# Patient Record
Sex: MALE | Race: BLACK OR AFRICAN AMERICAN | NOT HISPANIC OR LATINO
[De-identification: names, ages, dates, MRNs, and addresses within clinical notes are randomized per-mention and may not be internally consistent; named-entity substitution may affect disease eponyms.]

---

## 2019-02-26 PROBLEM — Z00.00 ENCOUNTER FOR PREVENTIVE HEALTH EXAMINATION: Status: ACTIVE | Noted: 2019-02-26

## 2019-03-11 ENCOUNTER — APPOINTMENT (OUTPATIENT)
Dept: SURGERY | Facility: CLINIC | Age: 64
End: 2019-03-11
Payer: COMMERCIAL

## 2019-03-11 VITALS
DIASTOLIC BLOOD PRESSURE: 80 MMHG | WEIGHT: 216.06 LBS | OXYGEN SATURATION: 96 % | SYSTOLIC BLOOD PRESSURE: 119 MMHG | HEIGHT: 75 IN | BODY MASS INDEX: 26.86 KG/M2 | HEART RATE: 84 BPM

## 2019-03-11 DIAGNOSIS — S31.139A PUNCTURE WOUND OF ABDOMINAL WALL W/OUT FOREIGN BODY, UNSPECIFIED QUADRANT W/OUT PENETRATION INTO PERITONEAL CAVITY, INITIAL ENCOUNTER: ICD-10-CM

## 2019-03-11 DIAGNOSIS — Z80.42 FAMILY HISTORY OF MALIGNANT NEOPLASM OF PROSTATE: ICD-10-CM
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
## 2019-03-11 DIAGNOSIS — B19.20 UNSPECIFIED VIRAL HEPATITIS C W/OUT HEPATIC COMA: ICD-10-CM

## 2019-03-11 DIAGNOSIS — Z82.49 FAMILY HISTORY OF ISCHEMIC HEART DISEASE AND OTHER DISEASES OF THE CIRCULATORY SYSTEM: ICD-10-CM

## 2019-03-11 DIAGNOSIS — Z87.891 PERSONAL HISTORY OF NICOTINE DEPENDENCE: ICD-10-CM

## 2019-03-11 DIAGNOSIS — Z87.898 PERSONAL HISTORY OF OTHER SPECIFIED CONDITIONS: ICD-10-CM

## 2019-03-11 DIAGNOSIS — F10.11 ALCOHOL ABUSE, IN REMISSION: ICD-10-CM

## 2019-03-11 DIAGNOSIS — K40.90 UNILATERAL INGUINAL HERNIA, W/OUT OBSTRUCTION OR GANGRENE, NOT SPECIFIED AS RECURRENT: ICD-10-CM

## 2019-03-11 DIAGNOSIS — Z80.0 FAMILY HISTORY OF MALIGNANT NEOPLASM OF DIGESTIVE ORGANS: ICD-10-CM

## 2019-03-11 DIAGNOSIS — Z83.3 FAMILY HISTORY OF DIABETES MELLITUS: ICD-10-CM

## 2019-03-11 PROCEDURE — 99244 OFF/OP CNSLTJ NEW/EST MOD 40: CPT

## 2019-03-11 RX ORDER — ASPIRIN ENTERIC COATED TABLETS 81 MG 81 MG/1
81 TABLET, DELAYED RELEASE ORAL
Refills: 0 | Status: ACTIVE | COMMUNITY

## 2019-03-11 NOTE — HISTORY OF PRESENT ILLNESS
[de-identified] : Mr. Tapia presented today for evaluation and management of a mass on the back of his neck, a mass of his right forearm, and a mass of his left upper arm, as well as a left inguinal hernia.  He stated the masses have been enlarging and have been present for at least one year.  He stated the masses hurt occasionally, although he described the pain as a tightness in the area of the masses.  He denied any episodes of rapid enlargement, erythema, or drainage of the masses.  He also noted he has a left inguinal hernia that occasionally causes pain.  He was scheduled for surgery a few years ago, but had just had his hip surgery on the same side and never rescheduled the procedure.
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
## 2019-03-11 NOTE — DATA REVIEWED
[FreeTextEntry1] : US abdomen (2/11/2019) - prominent liver with possible hepatic hemangioma.  Correlation with CT or MRI with contrast recommended.  No additional abnormal findings.\par \par CT abdomen (3/7/2019) - 3 arterial phase focal areas of nodular enhancement possibly benign vascular shunts.  The questionable hemangioma on ultrasound is not visualized.  Recommend correlation with contrast liver MRI in 6 months.

## 2019-03-11 NOTE — ASSESSMENT
[FreeTextEntry1] : Mr. Tapia is a 64-year-old man with a left upper extremity mass, a right forearm mass, a posterior neck mass, a left inguinal hernia, an umbilical incisional hernia, and a possible right inguinal hernia.  We will plan for a laparoscopic versus robotic left, possible bilateral, inguinal hernia repair with mesh, possible umbilical incisional hernia repair with mesh, excision of a left upper arm mass, excision of a right forearm mass, and excision of the posterior neck mass.  He stated he will be undergoing treatment for Hepatitis C, which should be completed prior to scheduling the planned procedures.

## 2019-03-11 NOTE — REVIEW OF SYSTEMS
[Suicidal] : not suicidal [Sleep Disturbances] : sleep disturbances [Anxiety] : no anxiety [Depression] : no depression [Change In Personality] : no personality change [Emotional Problems] : no emotional problems [Negative] : Heme/Lymph

## 2019-05-02 ENCOUNTER — APPOINTMENT (OUTPATIENT)
Dept: ENDOCRINOLOGY | Facility: CLINIC | Age: 64
End: 2019-05-02
Payer: COMMERCIAL

## 2019-05-02 VITALS
BODY MASS INDEX: 26.86 KG/M2 | DIASTOLIC BLOOD PRESSURE: 82 MMHG | WEIGHT: 216 LBS | HEART RATE: 103 BPM | HEIGHT: 75 IN | SYSTOLIC BLOOD PRESSURE: 144 MMHG

## 2019-05-02 LAB
GLUCOSE BLDC GLUCOMTR-MCNC: 378
HBA1C MFR BLD HPLC: 7.8

## 2019-05-02 PROCEDURE — 82962 GLUCOSE BLOOD TEST: CPT

## 2019-05-02 PROCEDURE — 83036 HEMOGLOBIN GLYCOSYLATED A1C: CPT | Mod: QW

## 2019-05-02 PROCEDURE — 99203 OFFICE O/P NEW LOW 30 MIN: CPT | Mod: 25

## 2019-05-02 RX ORDER — LIRAGLUTIDE 6 MG/ML
INJECTION SUBCUTANEOUS
Refills: 0 | Status: DISCONTINUED | COMMUNITY
End: 2019-05-02

## 2019-05-29 ENCOUNTER — APPOINTMENT (OUTPATIENT)
Dept: ENDOCRINOLOGY | Facility: CLINIC | Age: 64
End: 2019-05-29
Payer: COMMERCIAL

## 2019-05-29 VITALS
WEIGHT: 221 LBS | SYSTOLIC BLOOD PRESSURE: 128 MMHG | BODY MASS INDEX: 27.62 KG/M2 | DIASTOLIC BLOOD PRESSURE: 83 MMHG | HEART RATE: 82 BPM

## 2019-05-29 LAB — GLUCOSE BLDC GLUCOMTR-MCNC: 135

## 2019-05-29 PROCEDURE — 82962 GLUCOSE BLOOD TEST: CPT

## 2019-05-29 PROCEDURE — 99215 OFFICE O/P EST HI 40 MIN: CPT | Mod: 25

## 2019-05-29 NOTE — ASSESSMENT
[FreeTextEntry1] : Type 2 diabetes mellitus. HbA1c 7.8% last visit and blood glucose today 135 mg/dL today. He reports HbA1c in the 6% range for many years before cessation of Victoza. History of neuropathy. His blood sugars have not recently been at goal after changing from Victoza to Trulicity. Since a rapid reduction in hepatitis C viral load during direct-acting antiviral therapy for hepatitis C may lead to improvement in glucose metabolism and possible hypoglycemia, we will not adjust his regimen at this time. I recommend close follow-up, with possible change in regimen next visit as needed; can consider change from Trulicity to Ozempic if covered.\par Request recent laboratory testing\par Continue metformin 1000 mg twice daily\par Continue Trulicity 1.5 mg weekly\par Continue Farxiga 10 mg daily\par Check blood sugars 1-2 times daily\par He is on aspirin\par He is on a blood pressure regimen; blood pressure around goal\par He is not on a statin for cholesterol; reassess after hepatitis C therapy\par Nephrology screening: Treated with an angiotenin receptor blocker\par Last ophthalmology appointment: Around January 2019\par Last podiatry appointment: Upcoming appointment with podiatry\par Last dental appointment: May 2019\par \par Return to see nurse practitioner and nutrition in 1 month. Return to see me in 3 months. Patient advised to call earlier with significant hypo- or hyperglycemia.\par \par CC:\par Dr. Dixon Feng, Fax [phone]

## 2019-05-29 NOTE — HISTORY OF PRESENT ILLNESS
[FreeTextEntry1] : Mr. Tapia is a 64 year-old man with a history of type 2 diabetes mellitus, hypertension, hepatitis C presenting to establish care with me. He was seen by Analy Muñoz at the beginning of May 2019.\par \par Type 2 diabetes mellitus. HbA1c 7.8% last visit and blood glucose today 135 mg/dL today. He reports HbA1c in the 6% range for many years before cessation of Victoza. History of neuropathy.\par He was diagnosed with diabetes around 2009. He was hospitalized at the time of diagnosis; no subsequent hospitalizations for hyper- or hypoglycemia.\par He is currently taking metformin 1000 mg twice daily, Trulicity 1.5 mg weekly, Farxiga 10 mg daily. He was previously taking Victoza with good effect but insurance would no longer cover. Trulicity was started at his visit with Analy.\par He is checking blood sugars a few times per week. Recent postprandial values in the low 200s mg/dL. No recent hypoglycemia.\par He is on aspirin\par He is on a blood pressure regimen\par He is not on a statin for cholesterol\par Nephrology screening: Treated with an angiotenin receptor blocker\par Last ophthalmology appointment: Around January 2019\par Last podiatry appointment: Upcoming appointment with podiatry\par Last dental appointment: May 2019\par Diet: He has stopped eating red meat. He is eating more vegetables and lean proteins. He was having grapes and watermelon, but realized that they were causing hyperglycemia. \par Exercise: Gym 7 days/week, bicycle, weights, martial arts\par \par He started Harvoni the afternoon of his initial appointment here. He will be having hernia repairs and excision of masses in left upper extremity, right forearm, posterior neck once he completes Harvoni. He has chronic polyuria. No chest pain, shortness of breath, polydipsia, lower extremity numbness/tingling recently even off gabapentin.

## 2019-05-29 NOTE — PHYSICAL EXAM
[Alert] : alert [No Acute Distress] : no acute distress [Healthy Appearance] : healthy appearance [Normal Sclera/Conjunctiva] : normal sclera/conjunctiva [Normal Oropharynx] : the oropharynx was normal [No Neck Mass] : no neck mass was observed [Supple] : the neck was supple [No LAD] : no lymphadenopathy [No Thyroid Nodules] : there were no palpable thyroid nodules [Thyroid Not Enlarged] : the thyroid was not enlarged [Normal Rate and Effort] : normal respiratory rhythm and effort [Clear to Auscultation] : lungs were clear to auscultation bilaterally [Normal Rate] : heart rate was normal  [Normal S1, S2] : normal S1 and S2 [Regular Rhythm] : with a regular rhythm [Normal Gait] : normal gait [No Stigmata of Cushings Syndrome] : no stigmata of cushings syndrome [Normal Insight/Judgement] : insight and judgment were intact [Kyphosis] : no kyphosis present [de-identified] : no moon facies, no supraclavicular fat pads [Acanthosis Nigricans] : no acanthosis nigricans

## 2019-07-11 ENCOUNTER — MEDICATION RENEWAL (OUTPATIENT)
Age: 64
End: 2019-07-11

## 2019-07-18 ENCOUNTER — APPOINTMENT (OUTPATIENT)
Dept: ENDOCRINOLOGY | Facility: CLINIC | Age: 64
End: 2019-07-18
Payer: COMMERCIAL

## 2019-07-18 VITALS
SYSTOLIC BLOOD PRESSURE: 121 MMHG | WEIGHT: 229 LBS | HEART RATE: 81 BPM | BODY MASS INDEX: 28.62 KG/M2 | DIASTOLIC BLOOD PRESSURE: 80 MMHG

## 2019-07-18 LAB
GLUCOSE BLDC GLUCOMTR-MCNC: 111
HBA1C MFR BLD HPLC: 7.2

## 2019-07-18 PROCEDURE — 83036 HEMOGLOBIN GLYCOSYLATED A1C: CPT | Mod: QW

## 2019-07-18 PROCEDURE — 82962 GLUCOSE BLOOD TEST: CPT

## 2019-07-18 PROCEDURE — 36415 COLL VENOUS BLD VENIPUNCTURE: CPT

## 2019-07-18 PROCEDURE — 99214 OFFICE O/P EST MOD 30 MIN: CPT | Mod: 25

## 2019-07-18 PROCEDURE — 97802 MEDICAL NUTRITION INDIV IN: CPT

## 2019-07-23 LAB
ALBUMIN SERPL ELPH-MCNC: 4.6 G/DL
ALP BLD-CCNC: 69 U/L
ALT SERPL-CCNC: 19 U/L
ANION GAP SERPL CALC-SCNC: 13 MMOL/L
AST SERPL-CCNC: 21 U/L
BASOPHILS # BLD AUTO: 0.04 K/UL
BASOPHILS NFR BLD AUTO: 0.8 %
BILIRUB SERPL-MCNC: 0.5 MG/DL
BUN SERPL-MCNC: 16 MG/DL
CALCIUM SERPL-MCNC: 9.7 MG/DL
CHLORIDE SERPL-SCNC: 103 MMOL/L
CHOLEST SERPL-MCNC: 232 MG/DL
CHOLEST/HDLC SERPL: 4.8 RATIO
CO2 SERPL-SCNC: 26 MMOL/L
CREAT SERPL-MCNC: 1.19 MG/DL
CREAT SPEC-SCNC: 202 MG/DL
EOSINOPHIL # BLD AUTO: 0.11 K/UL
EOSINOPHIL NFR BLD AUTO: 2.1 %
ESTIMATED AVERAGE GLUCOSE: 177 MG/DL
GLUCOSE SERPL-MCNC: 101 MG/DL
HBA1C MFR BLD HPLC: 7.8 %
HCT VFR BLD CALC: 49.6 %
HDLC SERPL-MCNC: 48 MG/DL
HGB BLD-MCNC: 15 G/DL
IMM GRANULOCYTES NFR BLD AUTO: 0.4 %
LDLC SERPL CALC-MCNC: 170 MG/DL
LYMPHOCYTES # BLD AUTO: 1.35 K/UL
LYMPHOCYTES NFR BLD AUTO: 25.7 %
MAN DIFF?: NORMAL
MCHC RBC-ENTMCNC: 25.5 PG
MCHC RBC-ENTMCNC: 30.2 GM/DL
MCV RBC AUTO: 84.2 FL
MICROALBUMIN 24H UR DL<=1MG/L-MCNC: 2.1 MG/DL
MICROALBUMIN/CREAT 24H UR-RTO: 10 MG/G
MONOCYTES # BLD AUTO: 0.49 K/UL
MONOCYTES NFR BLD AUTO: 9.3 %
NEUTROPHILS # BLD AUTO: 3.24 K/UL
NEUTROPHILS NFR BLD AUTO: 61.7 %
PLATELET # BLD AUTO: 200 K/UL
POTASSIUM SERPL-SCNC: 4.9 MMOL/L
PROT SERPL-MCNC: 7.8 G/DL
RBC # BLD: 5.89 M/UL
RBC # FLD: 14.1 %
SODIUM SERPL-SCNC: 142 MMOL/L
T3 SERPL-MCNC: 116 NG/DL
T4 FREE SERPL-MCNC: 1.3 NG/DL
TRIGL SERPL-MCNC: 72 MG/DL
TSH SERPL-ACNC: 0.91 UIU/ML
VIT B12 SERPL-MCNC: 424 PG/ML
WBC # FLD AUTO: 5.25 K/UL

## 2019-09-25 ENCOUNTER — APPOINTMENT (OUTPATIENT)
Dept: ENDOCRINOLOGY | Facility: CLINIC | Age: 64
End: 2019-09-25
Payer: COMMERCIAL

## 2019-09-25 VITALS
BODY MASS INDEX: 28 KG/M2 | WEIGHT: 224 LBS | SYSTOLIC BLOOD PRESSURE: 121 MMHG | HEART RATE: 78 BPM | DIASTOLIC BLOOD PRESSURE: 79 MMHG

## 2019-09-25 LAB
GLUCOSE BLDC GLUCOMTR-MCNC: 119
HBA1C MFR BLD HPLC: 6.6

## 2019-09-25 PROCEDURE — 99214 OFFICE O/P EST MOD 30 MIN: CPT | Mod: 25

## 2019-09-25 PROCEDURE — 82962 GLUCOSE BLOOD TEST: CPT

## 2019-09-25 PROCEDURE — 83036 HEMOGLOBIN GLYCOSYLATED A1C: CPT | Mod: QW

## 2019-09-25 RX ORDER — LEDIPASVIR AND SOFOSBUVIR 45; 200 MG/1; MG/1
TABLET, FILM COATED ORAL
Refills: 0 | Status: DISCONTINUED | COMMUNITY
End: 2019-09-25

## 2019-09-25 RX ORDER — DULAGLUTIDE 1.5 MG/.5ML
1.5 INJECTION, SOLUTION SUBCUTANEOUS
Refills: 0 | Status: DISCONTINUED | COMMUNITY
Start: 2019-05-02 | End: 2019-09-25

## 2019-09-25 RX ORDER — DULAGLUTIDE 1.5 MG/.5ML
1.5 INJECTION, SOLUTION SUBCUTANEOUS
Qty: 3 | Refills: 3 | Status: DISCONTINUED | COMMUNITY
Start: 2019-05-02 | End: 2019-09-25

## 2019-09-25 NOTE — PHYSICAL EXAM
[Alert] : alert [No Acute Distress] : no acute distress [Healthy Appearance] : healthy appearance [Normal Oropharynx] : the oropharynx was normal [Normal Sclera/Conjunctiva] : normal sclera/conjunctiva [No Neck Mass] : no neck mass was observed [Supple] : the neck was supple [No LAD] : no lymphadenopathy [Thyroid Not Enlarged] : the thyroid was not enlarged [No Thyroid Nodules] : there were no palpable thyroid nodules [Normal Rate and Effort] : normal respiratory rhythm and effort [Clear to Auscultation] : lungs were clear to auscultation bilaterally [Normal Rate] : heart rate was normal  [Normal S1, S2] : normal S1 and S2 [Regular Rhythm] : with a regular rhythm [No Stigmata of Cushings Syndrome] : no stigmata of cushings syndrome [Normal Gait] : normal gait [Normal Insight/Judgement] : insight and judgment were intact [Kyphosis] : no kyphosis present [Acanthosis Nigricans] : no acanthosis nigricans [de-identified] : no moon facies, no supraclavicular fat pads

## 2019-09-25 NOTE — ASSESSMENT
[FreeTextEntry1] : Type 2 diabetes mellitus. HbA1c 6.6% and blood glucose 119 mg/dL today; HbA1c 7.2% in July 2019 and 7.8% in May 2019. Neuropathy. I congratulated him on his excellent glycemic control. We discussed evaluation by neurology for other etiologies of neuropathy since he now has worsening symptoms despite significant improvement in glycemic control. \par Continue metformin 1000 mg twice daily\par Adjust Ozempic to 0.5 mg weekly\par Continue Farxiga 10 mg daily\par Check blood sugars a few times per week\par He is on aspirin\par He is on a blood pressure regimen; blood pressure around goal\par He is not on a statin for cholesterol; he is amenable to starting atorvastatin 40 mg daily. We reviewed the risks and benefits of statin therapy, including but not limited to myalgias and arthralgias.\par Nephrology screening: Urine microalbumin within range in July 2019; treated with an angiotenin receptor blocker\par Last ophthalmology appointment: Around January 2019\par Last podiatry appointment: Upcoming appointment with podiatry\par Last dental appointment: May 2019\par \par Return to see me in 3 months. Patient advised to call earlier with significant hypo- or hyperglycemia.\par \par CC:\par Dr. Dixon Feng, Fax [phone]

## 2019-09-25 NOTE — HISTORY OF PRESENT ILLNESS
[FreeTextEntry1] : Mr. Tapia is a 64 year-old man with a history of type 2 diabetes mellitus, hypertension, hepatitis C now status post [facility] presenting for follow-up of his endocrine issues. I saw him for an initial visit in May 2019; he was seen by Analy Muñoz in July.\par \par Type 2 diabetes mellitus. HbA1c 6.6% and blood glucose 119 mg/dL today; HbA1c 7.2% in July 2019 and 7.8% in May 2019. He reports HbA1c in the 6% range for many years before cessation of Victoza. History of neuropathy.\par He was diagnosed with diabetes around 2009. He was hospitalized at the time of diagnosis; no subsequent hospitalizations for hyper- or hypoglycemia.\par At his initial visit he was taking metformin 1000 mg twice daily, Trulicity 1.5 mg weekly, Farxiga 10 mg daily. He was previously taking Victoza with good effect but insurance would no longer cover. Trulicity was started at his visit with Analy. Trulicity was recommended to be changed to Ozempic in July 2019 at his appointment; he just completed his prescription of Trulicity and started Ozempic last week.\par He is checking blood sugars a few times per week as below. No recent hypoglycemia.\par He is on aspirin\par He is on a blood pressure regimen\par He is not on a statin for cholesterol\par Nephrology screening: Urine microalbumin within range in July 2019; treated with an angiotenin receptor blocker\par Last ophthalmology appointment: Around January 2019\par Last podiatry appointment: Upcoming appointment with podiatry\par Last dental appointment: May 2019\par \par Interim History \par Fasting blood sugars up to 170 mg/dL today; usually 140s mg/dL. Postprandial blood sugars all lower than 180s mg/dL if checked. He started Ozempic 0.25 mg weekly last Thursday.\par He completed therapy with Harvoni with a good response per his report.\par He had a cardiac stress test that was normal in September 2018 per his report.\par He has pain in his feet bilaterally; gabapentin makes him sleepy so he does not take daily. He has chronic polyuria. He has occasional burning chest pain after meals. No shortness of breath, polydipsia.\par Medical and surgical history, medications, allergies, social and family history reviewed and updated as needed.

## 2019-11-15 ENCOUNTER — RX RENEWAL (OUTPATIENT)
Age: 64
End: 2019-11-15

## 2019-12-18 ENCOUNTER — APPOINTMENT (OUTPATIENT)
Dept: ENDOCRINOLOGY | Facility: CLINIC | Age: 64
End: 2019-12-18

## 2020-01-08 ENCOUNTER — APPOINTMENT (OUTPATIENT)
Dept: ENDOCRINOLOGY | Facility: CLINIC | Age: 65
End: 2020-01-08
Payer: COMMERCIAL

## 2020-01-08 ENCOUNTER — RESULT CHARGE (OUTPATIENT)
Age: 65
End: 2020-01-08

## 2020-01-08 VITALS
BODY MASS INDEX: 26.76 KG/M2 | WEIGHT: 222 LBS | DIASTOLIC BLOOD PRESSURE: 77 MMHG | HEIGHT: 76.5 IN | HEART RATE: 84 BPM | SYSTOLIC BLOOD PRESSURE: 120 MMHG

## 2020-01-08 LAB
GLUCOSE BLDC GLUCOMTR-MCNC: 149
HBA1C MFR BLD HPLC: 6.8

## 2020-01-08 PROCEDURE — 82947 ASSAY GLUCOSE BLOOD QUANT: CPT | Mod: QW

## 2020-01-08 PROCEDURE — 83036 HEMOGLOBIN GLYCOSYLATED A1C: CPT | Mod: QW

## 2020-01-08 PROCEDURE — 99214 OFFICE O/P EST MOD 30 MIN: CPT | Mod: 25
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
## 2020-01-08 NOTE — ASSESSMENT
[FreeTextEntry1] : Type 2 diabetes mellitus. Point-of-care HbA1c 6.8% and blood glucose 149 mg/dL today; HbA1c 6.6% in September 2019, 7.2% in July 2019 and 7.8% in May 2019. Neuropathy. I congratulated him on his excellent glycemic control. He is tolerating his current regimen and we will continue. We discussed evaluation by neurology for other etiologies of neuropathy since he now has worsening symptoms despite significant improvement in glycemic control. \par Continue metformin 1000 mg twice daily\par Continue Ozempic 0.5 mg weekly\par Continue Farxiga 10 mg daily\par Check blood sugars a few times per week\par He is on aspirin\par He is on a blood pressure regimen; blood pressure around goal\par He is on a statin for cholesterol; check lipid panel next visit and titrate as needed\par Nephrology screening: Urine microalbumin within range in July 2019; treated with an angiotenin receptor blocker\par Last ophthalmology appointment: Advised appointment\par Last podiatry appointment: Advised appointment\par Last dental appointment: January 2020\par \par Diabetic neuropathy. He has pain in his feet bilaterally; gabapentin makes him sleepy so he does not take daily. He has a referral to see a neurologist. We discussed a trial of capsaicin cream. \par \par Erectile dysfunction. We discussed referral to urology.\par \par Return to see me in 4 months. Patient advised to call earlier with significant hypo- or hyperglycemia.\par \par CC:\par Dr. Dixon Feng, Fax [phone]

## 2020-01-08 NOTE — PHYSICAL EXAM
[Alert] : alert [No Acute Distress] : no acute distress [Healthy Appearance] : healthy appearance [Normal Sclera/Conjunctiva] : normal sclera/conjunctiva [Normal Oropharynx] : the oropharynx was normal [No Neck Mass] : no neck mass was observed [Supple] : the neck was supple [No LAD] : no lymphadenopathy [Thyroid Not Enlarged] : the thyroid was not enlarged [No Thyroid Nodules] : there were no palpable thyroid nodules [Normal Rate and Effort] : normal respiratory rhythm and effort [Clear to Auscultation] : lungs were clear to auscultation bilaterally [Normal Rate] : heart rate was normal  [Normal S1, S2] : normal S1 and S2 [Regular Rhythm] : with a regular rhythm [No Stigmata of Cushings Syndrome] : no stigmata of cushings syndrome [Normal Gait] : normal gait [Normal Insight/Judgement] : insight and judgment were intact [Acanthosis Nigricans] : no acanthosis nigricans [Kyphosis] : no kyphosis present [de-identified] : no moon facies, no supraclavicular fat pads

## 2020-02-27 ENCOUNTER — APPOINTMENT (OUTPATIENT)
Dept: UROLOGY | Facility: CLINIC | Age: 65
End: 2020-02-27
Payer: COMMERCIAL

## 2020-02-27 VITALS — SYSTOLIC BLOOD PRESSURE: 135 MMHG | DIASTOLIC BLOOD PRESSURE: 88 MMHG | TEMPERATURE: 98.5 F

## 2020-02-27 DIAGNOSIS — N52.01 ERECTILE DYSFUNCTION DUE TO ARTERIAL INSUFFICIENCY: ICD-10-CM

## 2020-02-27 PROCEDURE — 99204 OFFICE O/P NEW MOD 45 MIN: CPT

## 2020-02-27 NOTE — PHYSICAL EXAM
[General Appearance - Well Developed] : well developed [Normal Appearance] : normal appearance [General Appearance - Well Nourished] : well nourished [Well Groomed] : well groomed [] : no respiratory distress [Heart Rate And Rhythm] : Heart rate and rhythm were normal [Abdomen Soft] : soft [Abdomen Tenderness] : non-tender [Costovertebral Angle Tenderness] : no ~M costovertebral angle tenderness [Abdomen Hernia] : no hernia was discovered [Urethral Meatus] : meatus normal [Penis Abnormality] : normal circumcised penis [Scrotum] : the scrotum was normal [Urinary Bladder Findings] : the bladder was normal on palpation [Epididymis] : the epididymides were normal [Testes Mass (___cm)] : there were no testicular masses [Testes Tenderness] : no tenderness of the testes [No Prostate Nodules] : no prostate nodules [Prostate Size ___ gm] : prostate size [unfilled] gm [Normal Station and Gait] : the gait and station were normal for the patient's age [No Focal Deficits] : no focal deficits [Skin Color & Pigmentation] : normal skin color and pigmentation [Oriented To Time, Place, And Person] : oriented to person, place, and time [No Palpable Adenopathy] : no palpable adenopathy

## 2020-02-27 NOTE — HISTORY OF PRESENT ILLNESS
[FreeTextEntry1] : 65M DM HTN comes in with ED over the last year. A1c 6.8%. 7/10 erections at baseline. intermittent function. +loss of sustaining. tried cilais 10 intermittent response. viagra 25mg good response 10/10. intermittent response. strong libido. nocturia x 1. goodFOS. no heamturai. no dysuria. father with hsitory prostate cancer.

## 2020-03-02 LAB
APPEARANCE: CLEAR
BACTERIA UR CULT: NORMAL
BACTERIA: NEGATIVE
BILIRUBIN URINE: NEGATIVE
BLOOD URINE: NEGATIVE
COLOR: YELLOW
GLUCOSE QUALITATIVE U: ABNORMAL
HYALINE CASTS: 1 /LPF
KETONES URINE: NEGATIVE
LEUKOCYTE ESTERASE URINE: NEGATIVE
MICROSCOPIC-UA: NORMAL
NITRITE URINE: NEGATIVE
PH URINE: 6
PROTEIN URINE: NORMAL
PSA SERPL-MCNC: 1.08 NG/ML
RED BLOOD CELLS URINE: 3 /HPF
SPECIFIC GRAVITY URINE: 1.04
SQUAMOUS EPITHELIAL CELLS: 1 /HPF
UROBILINOGEN URINE: NORMAL
WHITE BLOOD CELLS URINE: 0 /HPF

## 2020-03-09 ENCOUNTER — EMERGENCY (EMERGENCY)
Facility: HOSPITAL | Age: 65
LOS: 1 days | Discharge: HOME | End: 2020-03-09
Attending: EMERGENCY MEDICINE | Admitting: EMERGENCY MEDICINE
Payer: COMMERCIAL

## 2020-03-09 VITALS
HEART RATE: 85 BPM | SYSTOLIC BLOOD PRESSURE: 127 MMHG | DIASTOLIC BLOOD PRESSURE: 79 MMHG | RESPIRATION RATE: 16 BRPM | OXYGEN SATURATION: 97 % | TEMPERATURE: 98 F
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
## 2020-03-09 DIAGNOSIS — M54.2 CERVICALGIA: ICD-10-CM
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
## 2020-03-09 DIAGNOSIS — S16.1XXA STRAIN OF MUSCLE, FASCIA AND TENDON AT NECK LEVEL, INITIAL ENCOUNTER: ICD-10-CM
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
## 2020-03-09 PROCEDURE — 99284 EMERGENCY DEPT VISIT MOD MDM: CPT

## 2020-03-09 RX ORDER — CYCLOBENZAPRINE HYDROCHLORIDE 10 MG/1
1 TABLET, FILM COATED ORAL
Qty: 15 | Refills: 0
Start: 2020-03-09 | End: 2020-03-13

## 2020-03-09 RX ORDER — IBUPROFEN 200 MG
1 TABLET ORAL
Qty: 21 | Refills: 0
Start: 2020-03-09 | End: 2020-03-15

## 2020-03-09 RX ORDER — CYCLOBENZAPRINE HYDROCHLORIDE 10 MG/1
10 TABLET, FILM COATED ORAL ONCE
Refills: 0 | Status: COMPLETED | OUTPATIENT
Start: 2020-03-09 | End: 2020-03-09

## 2020-03-09 RX ORDER — IBUPROFEN 200 MG
800 TABLET ORAL ONCE
Refills: 0 | Status: COMPLETED | OUTPATIENT
Start: 2020-03-09 | End: 2020-03-09

## 2020-03-09 RX ADMIN — CYCLOBENZAPRINE HYDROCHLORIDE 10 MILLIGRAM(S): 10 TABLET, FILM COATED ORAL at 19:35

## 2020-03-09 RX ADMIN — Medication 800 MILLIGRAM(S): at 19:35

## 2020-03-09 NOTE — ED PROVIDER NOTE - NS ED ROS FT
GEN: no f/c, no malaise  HEENT: no nasal congestion, no sore throat   CV: no chest pain, no palpitations  RESP: no cough, no SOB  GI: no abd pain, no nausea, no vomiting, no diarrhea  : no dysuria or frequency  MSK: + neck pain, +L shoulder pain  NEURO: no headache, no dizziness, no focal numbness/weakness
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
## 2020-03-09 NOTE — ED PROVIDER NOTE - PATIENT PORTAL LINK FT
You can access the FollowMyHealth Patient Portal offered by [facility] by registering at the following website: [email or web address]. By joining [facility]’s FollowMyHealth portal, you will also be able to view your health information using other applications (apps) compatible with our system.
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
## 2020-03-09 NOTE — ED PROVIDER NOTE - NSFOLLOWUPINSTRUCTIONS_ED_ALL_ED_FT
take the medications as prescribed  the muscle relaxant makes you sleepy so don't do any activity while taking it  follow up with your primary care doctor in 1-2 days  return to the ER if worse
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
## 2020-03-09 NOTE — ED ADULT NURSE NOTE - CHPI ED NUR SYMPTOMS NEG
no acting out behaviors/no back pain/no bruising/no crying/no decreased eating/drinking/no difficulty bearing weight/no disorientation/no dizziness/no fussiness/no headache/no laceration/no loss of consciousness/no neck tenderness/no sleeping issues/no pain
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
## 2020-03-09 NOTE — ED PROVIDER NOTE - PHYSICAL EXAMINATION
GEN: Well appearing, well nourished, awake, alert, oriented to person, place, time/situation and in no apparent distress.  HENMT: Airway patent, neck supple. No stridor.  EYES: No pallor or scleral icterus.  CARDIAC: Normal rate, regular rhythm.  Heart sounds S1, S2.  No murmurs, rubs or gallops.   RESP: Breath sounds clear and equal bilaterally. Normal work of breathing. No respiratory distress.   GI: Abdomen soft, non-tender, no rebound or guarding. Bowel sounds present. No CVAT.   BACK: No midline spinal ttp, +L paraspinal cervical ttp  MSK: FROM, no obvious deformities, no pedal edema  NEURO: Alert and oriented x3, MAEx4 purposefully, follows commands appropriately, motor and sensation equal and intact B, normal gait   PSYCH: Calm and cooperative

## 2020-03-16 ENCOUNTER — APPOINTMENT (OUTPATIENT)
Dept: SURGERY | Facility: CLINIC | Age: 65
End: 2020-03-16

## 2020-05-12 ENCOUNTER — APPOINTMENT (OUTPATIENT)
Dept: ENDOCRINOLOGY | Facility: CLINIC | Age: 65
End: 2020-05-12

## 2020-05-26 ENCOUNTER — APPOINTMENT (OUTPATIENT)
Dept: UROLOGY | Facility: CLINIC | Age: 65
End: 2020-05-26

## 2020-05-28 ENCOUNTER — APPOINTMENT (OUTPATIENT)
Dept: UROLOGY | Facility: CLINIC | Age: 65
End: 2020-05-28
Payer: COMMERCIAL

## 2020-05-28 PROCEDURE — 52000 CYSTOURETHROSCOPY: CPT

## 2020-09-16 ENCOUNTER — RESULT CHARGE (OUTPATIENT)
Age: 65
End: 2020-09-16

## 2020-09-16 ENCOUNTER — APPOINTMENT (OUTPATIENT)
Dept: ENDOCRINOLOGY | Facility: CLINIC | Age: 65
End: 2020-09-16
Payer: COMMERCIAL

## 2020-09-16 VITALS
SYSTOLIC BLOOD PRESSURE: 120 MMHG | WEIGHT: 218 LBS | HEART RATE: 88 BPM | DIASTOLIC BLOOD PRESSURE: 80 MMHG | BODY MASS INDEX: 26.27 KG/M2 | HEIGHT: 76.5 IN

## 2020-09-16 PROCEDURE — 99205 OFFICE O/P NEW HI 60 MIN: CPT | Mod: 25

## 2020-09-16 PROCEDURE — 82962 GLUCOSE BLOOD TEST: CPT

## 2020-09-16 RX ORDER — DAPAGLIFLOZIN 10 MG/1
10 TABLET, FILM COATED ORAL DAILY
Qty: 90 | Refills: 3 | Status: DISCONTINUED | COMMUNITY
Start: 1900-01-01 | End: 2020-09-16

## 2020-09-18 LAB — GLUCOSE BLDC GLUCOMTR-MCNC: 136

## 2020-09-18 NOTE — PHYSICAL EXAM
[Alert] : alert [Normal Sclera/Conjunctiva] : normal sclera/conjunctiva [Normal Outer Ear/Nose] : the ears and nose were normal in appearance [No Neck Mass] : no neck mass was observed [No Respiratory Distress] : no respiratory distress [Clear to Auscultation] : lungs were clear to auscultation bilaterally [Normal Rate] : heart rate was normal [Regular Rhythm] : with a regular rhythm [No Edema] : no peripheral edema [Normal Bowel Sounds] : normal bowel sounds [Spine Straight] : spine straight [Normal Gait] : normal gait [No Stigmata of Cushings Syndrome] : no stigmata of Cushings Syndrome [Right Foot Was Examined] : right foot ~C was examined [Left Foot Was Examined] : left foot ~C was examined [Normal Reflexes] : deep tendon reflexes were 2+ and symmetric [Oriented x3] : oriented to person, place, and time [Acanthosis Nigricans] : no acanthosis nigricans [de-identified] : periorbital edema [de-identified] : L thyroid lobe nodular [FreeTextEntry1] : plantar calluses [FreeTextEntry2] : fungal nails [FreeTextEntry3] : normal [FreeTextEntry5] : plantar calluses [FreeTextEntry6] : fungal nails [FreeTextEntry7] : normal

## 2020-09-18 NOTE — ASSESSMENT
[FreeTextEntry1] : 64 y/o M pt with:\par \par 1. Hx of T2DM (dx in ~2009) with DM related complication of  peripheral neuropathy.\par 8/17/20 A1c 6.7%,s.creat 1.24, ldl-c  138 \par Diabetes treatment goals discussed, including target goals for BP, LDL-c, A1c, and body weight. \par Discussed the importance of BS monitoring, along with targets for pre and post prandial BS.  \par Briefly summarized anti-diabetic medications, including benefits and side effects. \par Advised pt to hold Farxiga, continue metformin and GLP1. He is on atorvastatin 40 mg qd\par Refer pt to see RD comprehensive DM teachings, including sick days management. \par Refer pt to podiatrist. \par Will order labs today, including metabolic and lipid profiles. \par \par Return in: 3 months

## 2020-09-18 NOTE — ADDENDUM
[FreeTextEntry1] : I, Nati Massey, acted solely as a scribe for Dr. Jackson Goldstein on this date. 09/16/2020.

## 2020-09-18 NOTE — END OF VISIT
[FreeTextEntry3] : All medical record entries made by the Scribe were at my, Dr. Jackson Goldstein, direction and personally dictated by me on 09/16/2020. I have reviewed the chart and agree that the record accurately reflects my personal performance of the history, physical exam, assessment and plan. I have also personally directed, reviewed and agreed with the chart.  [Time Spent: ___ minutes] : I have spent [unfilled] minutes of time on the encounter. [>50% of the face to face encounter time was spent on counseling and/or coordination of care for ___] : Greater than 50% of the face to face encounter time was spent on counseling and/or coordination of care for [unfilled]

## 2020-09-18 NOTE — HISTORY OF PRESENT ILLNESS
[FreeTextEntry1] : 64 y/o M pt, with Hx of T2DM (dx in ~2009) with DM related complications of peripheral neuropathy, former pt of Dr. Erin Levy, presents today to establish endocrine care with me. No information on CAD. \par Other PMHx: HTN, Hepatitis C s/p Harvoni\par FHx: DM (mother)\par SHx: Non smoker. No EtOH use. \par Lifestyle: Eats 3 meals a day. Checks BS 1-2x a day.\par Last Funduscopic visit: 1/2020\par NKDA\par \par 09/16/2020\par Pt presents today with POCT 136, feeling well with no acute complaints other than preexisting peripheral neuropathy. He lost 6 lbs in past 1 year. He notes FBS of 112-113. He does not have frequent hypoglycemia but states that it does every occur now and then.  \par Denies osmotic diuresis. \par \par Current Medications: Metformin 1000mg BID, Ozempic, Farxiga 10mg QAM, Losartan\par \par Most recent lab studies:\par - 1/8/20: POCT A1c 6.8%, \par - 7/18/19: A1c 7.8%, Glucose 101, s.creat 1.19, TG 72, Cholesterol 232 (H), LDL-c 170 (H), urine no protein, TSH 0.91, Free T4 1.3,

## 2020-09-18 NOTE — REVIEW OF SYSTEMS
[Recent Weight Loss (___ Lbs)] : recent weight loss: [unfilled] lbs [Negative] : Heme/Lymph [de-identified] : pins and needles sensation in lower extremities  [de-identified] : occasional hypoglycemia

## 2020-09-25 NOTE — ED ADULT TRIAGE NOTE - NS ED NURSE AMBULANCES
No complaints No complaints No complaints No complaints No complaints No complaints No complaints No complaints No complaints No complaints No complaints No complaints No complaints No complaints No complaints No complaints No complaints No complaints No complaints No complaints [employer] No complaints No complaints No complaints No complaints No complaints

## 2020-10-07 ENCOUNTER — APPOINTMENT (OUTPATIENT)
Dept: ENDOCRINOLOGY | Facility: CLINIC | Age: 65
End: 2020-10-07
Payer: COMMERCIAL

## 2020-10-07 VITALS — WEIGHT: 221 LBS | BODY MASS INDEX: 26.55 KG/M2

## 2020-10-07 PROCEDURE — 97803 MED NUTRITION INDIV SUBSEQ: CPT

## 2020-10-30 ENCOUNTER — APPOINTMENT (OUTPATIENT)
Dept: ORTHOPEDIC SURGERY | Facility: CLINIC | Age: 65
End: 2020-10-30
Payer: COMMERCIAL

## 2020-10-30 DIAGNOSIS — Q66.6 OTHER CONGENITAL VALGUS DEFORMITIES OF FEET: ICD-10-CM

## 2020-10-30 DIAGNOSIS — M79.672 PAIN IN LEFT FOOT: ICD-10-CM

## 2020-10-30 DIAGNOSIS — M20.21 HALLUX RIGIDUS, RIGHT FOOT: ICD-10-CM

## 2020-10-30 DIAGNOSIS — M79.671 PAIN IN RIGHT FOOT: ICD-10-CM

## 2020-10-30 DIAGNOSIS — M20.22 HALLUX RIGIDUS, LEFT FOOT: ICD-10-CM

## 2020-10-30 DIAGNOSIS — M21.6X2 OTHER ACQUIRED DEFORMITIES OF LEFT FOOT: ICD-10-CM

## 2020-10-30 PROCEDURE — 99072 ADDL SUPL MATRL&STAF TM PHE: CPT

## 2020-10-30 PROCEDURE — 73610 X-RAY EXAM OF ANKLE: CPT | Mod: RT

## 2020-10-30 PROCEDURE — 99204 OFFICE O/P NEW MOD 45 MIN: CPT

## 2020-10-30 PROCEDURE — 73630 X-RAY EXAM OF FOOT: CPT | Mod: LT

## 2020-11-03 PROBLEM — Q66.6 PES PLANOVALGUS: Status: ACTIVE | Noted: 2020-10-30

## 2020-11-03 PROBLEM — M79.672 LEFT FOOT PAIN: Status: ACTIVE | Noted: 2020-10-29

## 2020-11-03 PROBLEM — M21.6X2 MIDFOOT COLLAPSE OF LEFT LOWER EXTREMITY: Status: ACTIVE | Noted: 2020-11-03

## 2020-11-03 PROBLEM — M20.22 ACQUIRED HALLUX RIGIDUS OF LEFT FOOT: Status: ACTIVE | Noted: 2020-11-03

## 2020-11-03 PROBLEM — M20.21 ACQUIRED HALLUX RIGIDUS OF RIGHT FOOT: Status: ACTIVE | Noted: 2020-11-03

## 2020-11-03 PROBLEM — M79.671 RIGHT FOOT PAIN: Status: ACTIVE | Noted: 2020-10-29

## 2020-11-20 ENCOUNTER — APPOINTMENT (OUTPATIENT)
Dept: UROLOGY | Facility: CLINIC | Age: 65
End: 2020-11-20

## 2020-12-22 ENCOUNTER — APPOINTMENT (OUTPATIENT)
Dept: ENDOCRINOLOGY | Facility: CLINIC | Age: 65
End: 2020-12-22
Payer: COMMERCIAL

## 2020-12-22 PROCEDURE — 99214 OFFICE O/P EST MOD 30 MIN: CPT | Mod: 95

## 2020-12-24 NOTE — END OF VISIT
[FreeTextEntry3] : All medical record entries made by the Scribe were at my, Dr. Jackson Goldstein, direction and personally dictated by me on 12/22/2020. I have reviewed the chart and agree that the record accurately reflects my personal performance of the history, physical exam, assessment and plan. I have also personally directed, reviewed and agreed with the chart.  [Time Spent: ___ minutes] : I have spent [unfilled] minutes of time on the encounter.

## 2020-12-24 NOTE — ADDENDUM
[FreeTextEntry1] : I, Alva Kolb, acted solely as a scribe for Dr. Jackson Goldstein on this date. 12/22/2020.

## 2020-12-24 NOTE — HISTORY OF PRESENT ILLNESS
[Home] : at home, [unfilled] , at the time of the visit. [Medical Office: ([facility])___] : at the medical office located in  [Verbal consent obtained from patient] : the patient, [unfilled] [FreeTextEntry1] : 66 y/o M pt, with Hx of T2DM (dx in ~2009) with DM related complications of peripheral neuropathy.\par  No information on CAD. \par Other PMHx: HTN, Hepatitis C s/p Harvoni\par FHx: DM (mother)\par SHx: Non smoker. No EtOH use. \par Lifestyle: Eats 3 meals a day. Checks BS 1-2x a day.\par Last Funduscopic visit: recently as per pt\par NKDA\par Saw RD\par \par 09/16/2020\par Pt presents today with POCT 136, feeling well with no acute complaints other than preexisting peripheral neuropathy. He lost 6 lbs in past 1 year. He notes FBS of 112-113. He does not have frequent hypoglycemia but states that it does every occur now and then.  \par Denies osmotic diuresis. \par \par 12/22/20\par Pt did not have any questions prior to the initiation of the telehealth visit. \par \par Today pt presents for DM f/u via telehealth, feeling well. Pt notes his peripheral neuropathy is worsening. He notes FBS readings of 113, 114.\par Post Prandial BS readings: 170, 180\par He states he is on Metformin 1g QD, Ozempic, baby ASA, Gabapentin, and Losartan.  \par Pt notes he modified his diet and increased his physical activities. \par Denies osmotic diuresis symptoms, taking medications for cholesterol, and low BS.  \par \par Current Medications: Metformin 1000mg QD, Ozempic, Farxiga 10mg QAM (held), Losartan, Gabapentin\par \par Most recent lab studies:\par - 8/17/20 A1c 6.7%, s. creat 1.24, Ca 10.0, LDL-c 138, Total Cholesterol 210, , \par - 1/8/20: POCT A1c 6.8%, \par - 7/18/19: A1c 7.8%, Glucose 101, s.creat 1.19, TG 72, Cholesterol 232 (H), LDL-c 170 (H), urine no protein, TSH 0.91, Free T4 1.3,

## 2020-12-24 NOTE — ASSESSMENT
[Importance of Diet and Exercise] : importance of diet and exercise to improve glycemic control, achieve weight loss and improve cardiovascular health [Self Monitoring of Blood Glucose] : self monitoring of blood glucose [FreeTextEntry1] : 64 y/o M pt with:\par \par 1. T2DM (dx in ~2009) with DM related complication of  peripheral neuropathy:\par Pt is at increased risk of developing ASCVD. Noticed pt's cholesterol was not in target. Diabetes treatment goals discussed. Recommend pt continue with current DM medications, initiate Atorvastatin 20mg (pt was not taking Atorvastatin) and work on his lifestyle/diet. Will order labs today.\par \par Return in: 4 months

## 2020-12-29 ENCOUNTER — APPOINTMENT (OUTPATIENT)
Dept: UROLOGY | Facility: CLINIC | Age: 65
End: 2020-12-29
Payer: COMMERCIAL

## 2020-12-29 VITALS — SYSTOLIC BLOOD PRESSURE: 125 MMHG | DIASTOLIC BLOOD PRESSURE: 78 MMHG | HEART RATE: 90 BPM | TEMPERATURE: 97.7 F

## 2020-12-29 DIAGNOSIS — R31.29 OTHER MICROSCOPIC HEMATURIA: ICD-10-CM

## 2020-12-29 DIAGNOSIS — R39.9 UNSPECIFIED SYMPTOMS AND SIGNS INVOLVING THE GENITOURINARY SYSTEM: ICD-10-CM

## 2020-12-29 PROCEDURE — 99213 OFFICE O/P EST LOW 20 MIN: CPT

## 2020-12-29 PROCEDURE — 99072 ADDL SUPL MATRL&STAF TM PHE: CPT

## 2020-12-29 NOTE — ASSESSMENT
[FreeTextEntry1] : follow up microhematuria\par PVR today 13 mL, no urinary c/o\par UA, UCx today\par Kidney Bladder U/S, results over the phone \par Final clinical plan then\par If normal, f/U in 1 year

## 2020-12-29 NOTE — HISTORY OF PRESENT ILLNESS
[FreeTextEntry1] : 65M here for follow up microhematuria\par ED - discussed already at initial visit, on sildenafil PRN \par UA 03/20 - 3 RBC- microhematuria,no repeat UA\par Kidney bladder U/S - no done\par cystoscopy 05/20- negative, enlargement of prostatic lobes\par asymptomatic now and has been\par FMHx- negative for stones, CaP-father\par PVR 13 mL\par  [Urinary Urgency] : no urinary urgency [Urinary Frequency] : no urinary frequency [Nocturia] : no nocturia [Straining] : no straining [Weak Stream] : no weak stream [Hematuria - Gross] : no gross hematuria [Abdominal Pain] : no abdominal pain [Flank Pain] : no flank pain [Fever] : no fever [Fatigue] : no fatigue [Nausea] : no nausea

## 2020-12-29 NOTE — PHYSICAL EXAM
[General Appearance - Well Developed] : well developed [General Appearance - Well Nourished] : well nourished [Normal Appearance] : normal appearance [Well Groomed] : well groomed [General Appearance - In No Acute Distress] : no acute distress [Heart Rate And Rhythm] : Heart rate and rhythm were normal [] : no respiratory distress [Abdomen Soft] : soft [Abdomen Tenderness] : non-tender [Costovertebral Angle Tenderness] : no ~M costovertebral angle tenderness [Normal Station and Gait] : the gait and station were normal for the patient's age [Skin Color & Pigmentation] : normal skin color and pigmentation [No Focal Deficits] : no focal deficits [Oriented To Time, Place, And Person] : oriented to person, place, and time [Affect] : the affect was normal [Mood] : the mood was normal [Not Anxious] : not anxious [No Palpable Adenopathy] : no palpable adenopathy [FreeTextEntry1] : defered

## 2021-01-03 ENCOUNTER — NON-APPOINTMENT (OUTPATIENT)
Age: 66
End: 2021-01-03

## 2021-01-05 LAB
APPEARANCE: CLEAR
BACTERIA UR CULT: NORMAL
BACTERIA: NEGATIVE
BILIRUBIN URINE: NEGATIVE
BLOOD URINE: NEGATIVE
CALCIUM OXALATE CRYSTALS: ABNORMAL
COLOR: YELLOW
GLUCOSE QUALITATIVE U: NEGATIVE
HYALINE CASTS: 0 /LPF
KETONES URINE: NEGATIVE
LEUKOCYTE ESTERASE URINE: NEGATIVE
MICROSCOPIC-UA: NORMAL
NITRITE URINE: NEGATIVE
PH URINE: 6.5
PROTEIN URINE: NORMAL
RED BLOOD CELLS URINE: 1 /HPF
SPECIFIC GRAVITY URINE: 1.03
SQUAMOUS EPITHELIAL CELLS: 0 /HPF
UROBILINOGEN URINE: NORMAL
WHITE BLOOD CELLS URINE: 0 /HPF

## 2021-01-12 ENCOUNTER — TRANSCRIPTION ENCOUNTER (OUTPATIENT)
Age: 66
End: 2021-01-12

## 2021-02-02 ENCOUNTER — APPOINTMENT (OUTPATIENT)
Dept: ENDOCRINOLOGY | Facility: CLINIC | Age: 66
End: 2021-02-02
Payer: COMMERCIAL

## 2021-02-02 PROCEDURE — 99214 OFFICE O/P EST MOD 30 MIN: CPT | Mod: 95

## 2021-02-03 NOTE — HISTORY OF PRESENT ILLNESS
[Home] : at home, [unfilled] , at the time of the visit. [Medical Office: ([facility])___] : at the medical office located in  [Verbal consent obtained from patient] : the patient, [unfilled] [FreeTextEntry1] : 66 y/o M pt, with Hx of T2DM (dx in ~2009) with DM related complications of peripheral neuropathy.\par  No information on CAD. \par Other PMHx: HTN, Hepatitis C s/p Harvoni\par FHx: DM (mother)\par SHx: Non smoker. No EtOH use. \par Lifestyle: Eats 3 meals a day. Checks BS 1-2x a day.\par Last Funduscopic visit: recently as per pt\par NKDA\par Saw RD\par \par 09/16/2020\par Pt presents today with POCT 136, feeling well with no acute complaints other than preexisting peripheral neuropathy. He lost 6 lbs in past 1 year. He notes FBS of 112-113. He does not have frequent hypoglycemia but states that it does every occur now and then.  \par Denies osmotic diuresis. \par \par 12/22/20\par Pt did not have any questions prior to the initiation of the telehealth visit. \par Today pt presents for DM f/u via telehealth, feeling well. Pt notes his peripheral neuropathy is worsening. He notes FBS readings of 113, 114.\par Post Prandial BS readings: 170, 180\par He states he is on Metformin 1g QD, Ozempic, baby ASA, Gabapentin, and Losartan.  \par Pt notes he modified his diet and increased his physical activities. \par Denies osmotic diuresis symptoms, taking medications for cholesterol, and low BS.  \par 2/2/21 Tele video visit\par Patient did not have any questions prior to the initiation of today's visit\par History of type 2 diabetes ~ 10 years with no known history of CAD.\par He is feeling better, trying to modify lifestyle and increase energy expenditure\par Testing glucose sporadically\par Eye funduscopic exam : 2020\par Recent A1c 6.7% (12/28/20), urine a/c ratio 6, however LDL-c 142, HDL 43\par \par Current Medications: Metformin 1000 mg bid QD, Ozempic weekly, Losartan, Gabapentin\par \par Most recent lab studies:\par - 8/17/20 A1c 6.7%, s. creat 1.24, Ca 10.0, LDL-c 138, Total Cholesterol 210, , \par - 1/8/20: POCT A1c 6.8%, \par - 7/18/19: A1c 7.8%, Glucose 101, s.creat 1.19, TG 72, Cholesterol 232 (H), LDL-c 170 (H), urine no protein, TSH 0.91, Free T4 1.3,

## 2021-02-03 NOTE — ASSESSMENT
[Importance of Diet and Exercise] : importance of diet and exercise to improve glycemic control, achieve weight loss and improve cardiovascular health [Self Monitoring of Blood Glucose] : self monitoring of blood glucose [Exercise/Effect on Glucose] : exercise/effect on glucose [FreeTextEntry1] : 66 y/o M pt with:\par \par 1. T2DM (dx in ~2009) with DM related complication of  peripheral neuropathy:\par Pt is at increased risk of developing ASCVD. Noticed pt's cholesterol was not in target. \par Diabetes treatment goals discussed. Recommend pt continue with current Metformin 1 gm bid and Ozempic weekly inj ( weight remains unchanged- BMI 26.7 ) DM medications, restart  Atorvastatin 20 mg ( Rxed Sept 2019 was not taking Atorvastatin) and work on his lifestyle/diet. \par Will order labs in 6 weeks\par \par Return in: 4 months

## 2021-04-13 ENCOUNTER — APPOINTMENT (OUTPATIENT)
Dept: ENDOCRINOLOGY | Facility: CLINIC | Age: 66
End: 2021-04-13
Payer: COMMERCIAL

## 2021-04-13 VITALS
SYSTOLIC BLOOD PRESSURE: 126 MMHG | HEIGHT: 76.5 IN | BODY MASS INDEX: 26.51 KG/M2 | WEIGHT: 220 LBS | HEART RATE: 88 BPM | DIASTOLIC BLOOD PRESSURE: 81 MMHG

## 2021-04-13 PROCEDURE — 99072 ADDL SUPL MATRL&STAF TM PHE: CPT

## 2021-04-13 PROCEDURE — 99214 OFFICE O/P EST MOD 30 MIN: CPT | Mod: 25

## 2021-04-13 PROCEDURE — 82962 GLUCOSE BLOOD TEST: CPT

## 2021-04-13 RX ORDER — ATORVASTATIN CALCIUM 40 MG/1
40 TABLET, FILM COATED ORAL DAILY
Qty: 90 | Refills: 1 | Status: ACTIVE | COMMUNITY
Start: 2020-12-22 | End: 1900-01-01

## 2021-04-13 RX ORDER — LANCETS 28 GAUGE
EACH MISCELLANEOUS
Qty: 180 | Refills: 3 | Status: DISCONTINUED | COMMUNITY
Start: 2020-01-08 | End: 2021-04-13

## 2021-04-13 RX ORDER — ATORVASTATIN CALCIUM 40 MG/1
40 TABLET, FILM COATED ORAL
Qty: 90 | Refills: 3 | Status: DISCONTINUED | COMMUNITY
Start: 2019-09-25 | End: 2021-04-13

## 2021-04-13 NOTE — PHYSICAL EXAM
[Alert] : alert [No Acute Distress] : no acute distress [Normal Sclera/Conjunctiva] : normal sclera/conjunctiva [Normal Outer Ear/Nose] : the ears and nose were normal in appearance [No Neck Mass] : no neck mass was observed [Thyroid Not Enlarged] : the thyroid was not enlarged [No Respiratory Distress] : no respiratory distress [Clear to Auscultation] : lungs were clear to auscultation bilaterally [Normal S1, S2] : normal S1 and S2 [Normal Rate] : heart rate was normal [Normal Bowel Sounds] : normal bowel sounds [Normal Gait] : normal gait [No Rash] : no rash [Right Foot Was Examined] : right foot ~C was examined [Left Foot Was Examined] : left foot ~C was examined [2+] : 2+ in the dorsalis pedis [Normal Reflexes] : deep tendon reflexes were 2+ and symmetric [Oriented x3] : oriented to person, place, and time

## 2021-04-16 NOTE — HISTORY OF PRESENT ILLNESS
[FreeTextEntry1] : 67 y/o M pt, with Hx of T2DM (dx in ~2009) with DM related complications of peripheral neuropathy.\par  No information on CAD. \par Other PMHx: HTN, Hepatitis C s/p Harvoni\par FHx: DM (mother)\par SHx: Non smoker. No EtOH use. \par PSHx: Hernia surgery (3/10/21) \par Lifestyle: Eats 3 meals a day. Checks BS 1-2x a day.\par Last Funduscopic visit: less than a year as per pt\par NKDA\par Saw RD\par \par 09/16/2020\par Pt presents today with POCT 136, feeling well with no acute complaints other than preexisting peripheral neuropathy. He lost 6 lbs in past 1 year. He notes FBS of 112-113. He does not have frequent hypoglycemia but states that it does every occur now and then. \par Denies osmotic diuresis. \par \par 12/22/20\par Pt did not have any questions prior to the initiation of the telehealth visit. \par Today pt presents for DM f/u via telehealth, feeling well. Pt notes his peripheral neuropathy is worsening. He notes FBS readings of 113, 114.\par Post Prandial BS readings: 170, 180\par He states he is on Metformin 1g QD, Ozempic, baby ASA, Gabapentin, and Losartan. \par Pt notes he modified his diet and increased his physical activities. \par Denies osmotic diuresis symptoms, taking medications for cholesterol, and low BS. \par 2/2/21 Tele video visit\par Patient did not have any questions prior to the initiation of today's visit\par History of type 2 diabetes ~ 10 years with no known history of CAD.\par He is feeling better, trying to modify lifestyle and increase energy expenditure\par Testing glucose sporadically\par Eye funduscopic exam : 2020\par Recent A1c 6.7% (12/28/20), urine a/c ratio 6, however LDL-c 142, HDL 43\par \par 4/13/21\par Today pt presents with POCT 143, /81, BMI 26.43 feeling well, with no complaints. \par Pt reports checking BS pre and post prandially. \par Pt notes that after his surgery for hernia on 3/10/21, his BS readings increased. Pt reports pain/discomfort after surgery, and suspects painkillers caused BS readings to go up \par Pt reports suffering pain in feet from neuropathy for 5-6 years and takes Gabapentin medication, but does not feel it is working. \par \par Current Medications: Metformin 1000 mg bid QD, Ozempic weekly, Gabapentin, Atorvastatin 40 mg\par \par Labs: \par - 12/28/20: A1c 6.7, LDL-c 142, cholesterol 207, non-HDL cholesterol 164, s.creat 0.96, Ca 10.0, \par - 8/17/20 A1c 6.7%, s. creat 1.24, Ca 10.0, LDL-c 138, Total Cholesterol 210, , \par - 1/8/20: POCT A1c 6.8%, \par - 7/18/19: A1c 7.8%, Glucose 101, s.creat 1.19, TG 72, Cholesterol 232 (H), LDL-c 170 (H), urine no protein, TSH 0.91, Free T4 1.3,

## 2021-04-16 NOTE — ADDENDUM
[FreeTextEntry1] : I, Vishal Matthews, acted solely as a scribe for Dr. Jackson Goldstein on this date. 04/13/2021

## 2021-04-16 NOTE — ASSESSMENT
[Importance of Diet and Exercise] : importance of diet and exercise to improve glycemic control, achieve weight loss and improve cardiovascular health [Exercise/Effect on Glucose] : exercise/effect on glucose [Self Monitoring of Blood Glucose] : self monitoring of blood glucose [FreeTextEntry1] : 65 y/o M pt with:\par \par 1. T2DM (dx in ~2009) with DM related complication, peripheral neuropathy:\par After pt had surgery for hernia, BS readings increased, but they have begun to normalize slightly. \par Pt continues c/o neuropathy and he is on Gabapentin. \par Recommend pt to continue Metformin and Ozempic. \par Will order labs today and contact pt with results. \par \par 2. Hyperlipidemia; LDL-c 142 on 12/28/20\par Pt is on Atorvastatin 40 mg. \par Send lipid profile today and contact pt with results. \par \par Return in: 4 months

## 2021-04-16 NOTE — END OF VISIT
[FreeTextEntry3] : All medical record entries made by the Scribe were at my, Dr. Jackson Goldstein, direction and personally dictated by me on 04/13/2021. I have reviewed the chart and agree that the record accurately reflects my personal performance of the history, physical exam, assessment and plan. I have also personally directed, reviewed and agreed with the chart.  [Time Spent: ___ minutes] : I have spent [unfilled] minutes of time on the encounter.

## 2021-07-01 ENCOUNTER — RX RENEWAL (OUTPATIENT)
Age: 66
End: 2021-07-01

## 2021-07-15 ENCOUNTER — RX RENEWAL (OUTPATIENT)
Age: 66
End: 2021-07-15

## 2021-08-12 ENCOUNTER — NON-APPOINTMENT (OUTPATIENT)
Age: 66
End: 2021-08-12

## 2021-08-12 ENCOUNTER — APPOINTMENT (OUTPATIENT)
Dept: ENDOCRINOLOGY | Facility: CLINIC | Age: 66
End: 2021-08-12
Payer: COMMERCIAL

## 2021-08-12 LAB
GLUCOSE BLDC GLUCOMTR-MCNC: 143
GLUCOSE BLDC GLUCOMTR-MCNC: 275

## 2021-08-12 PROCEDURE — 82962 GLUCOSE BLOOD TEST: CPT

## 2021-08-12 PROCEDURE — 99214 OFFICE O/P EST MOD 30 MIN: CPT | Mod: 25

## 2021-08-12 NOTE — PHYSICAL EXAM
[Alert] : alert [No Acute Distress] : no acute distress [Normal Sclera/Conjunctiva] : normal sclera/conjunctiva [Normal Outer Ear/Nose] : the ears and nose were normal in appearance [No Neck Mass] : no neck mass was observed [Thyroid Not Enlarged] : the thyroid was not enlarged [No Respiratory Distress] : no respiratory distress [Clear to Auscultation] : lungs were clear to auscultation bilaterally [Normal S1, S2] : normal S1 and S2 [Normal Rate] : heart rate was normal [Normal Bowel Sounds] : normal bowel sounds [Normal Gait] : normal gait [Right Foot Was Examined] : right foot ~C was examined [Left Foot Was Examined] : left foot ~C was examined [2+] : 2+ in the dorsalis pedis [Normal Reflexes] : deep tendon reflexes were 2+ and symmetric [Oriented x3] : oriented to person, place, and time [Vibration Dec.] : diminished vibratory sensation at the level of the toes [Diminished Throughout Both Feet] : diminished tactile sensation with monofilament testing throughout both feet

## 2021-08-13 NOTE — HISTORY OF PRESENT ILLNESS
[FreeTextEntry1] : 67 y/o M pt, with Hx of T2DM (dx in ~2009) with DM related complications of peripheral neuropathy. No information on CAD. \par Other PMHx: HTN, Hepatitis C s/p Harvoni\par PSHx: Hernia surgery (3/10/21) \par FHx: DM (mother)\par SHx: Non smoker. No EtOH use. \par Lifestyle: Eats 3 meals a day. Checks BS 1-2x a day.\par \par 08/12/2021\par Pt presents today with POCT 275, /81 and BMI 26.43 for DM f/u. He is feeling well with no acute complaints. \par He has gained 2 lbs in last 7 months. Pt had been eating better but then stopped doing it. He has been walking a lot. He notes BS of 120-130. \par Pt will call his ophthalmologist tomorrow to schedule an appointment. \par \par Current Medications: Metformin 1000 mg bid, Ozempic qw, Glimepiride 2 mg, Atorvastatin 40 mg, Gabapentin, \par \par Labs: \par - 04/16/21: A1c 8.1%, s.creat 1.07, LDL-c 52, Alb/Creat 5\par - 12/28/20: A1c 6.7%, s.creat 0.96, Cholesterol 207, LDL-c 142, Ca 10.0\par - 08/17/20: A1c 6.7%, s.creat 1.24, Cholesterol 210, LDL-c 138,  , Ca 10.0\par - 01/08/20: POCT A1c 6.8%, \par - 07/18/19: A1c 7.8%, s.creat 1.19, Cholesterol 232, LDL-c 170, urine no protein, TSH 0.91, Free T4 1.3

## 2021-08-13 NOTE — ASSESSMENT
[FreeTextEntry1] : 65 y/o M pt with:\par \par 1. T2DM (dx in ~2009) with DM related complications of peripheral neuropathy:\par He is inconsistent with his calorie restriction. He is happy to see good results when he makes effort on his diet and lifestyle. Pt states that he will work again on it consistently. No need for third medication or increment in current medication dose. \par A1c goal of 7%. \par \par 2. Hyperlipidemia:\par He is at increased risk for ASCVD. Considering cardiologist evolution. For the time being, reduce fat consumption and continue with Atorvastatin 40 mg. \par \par Return in: 4 months [Carbohydrate Consistent Diet] : carbohydrate consistent diet [Importance of Diet and Exercise] : importance of diet and exercise to improve glycemic control, achieve weight loss and improve cardiovascular health [Self Monitoring of Blood Glucose] : self monitoring of blood glucose [Weight Loss] : weight loss

## 2021-08-13 NOTE — END OF VISIT
[FreeTextEntry3] : All medical record entries made by the Scribe were at my, Dr. Jackson Goldstein, direction and personally dictated by me on 08/12/2021. I have reviewed the chart and agree that the record accurately reflects my personal performance of the history, physical exam, assessment and plan. I have also personally directed, reviewed and agreed with the chart.  [Time Spent: ___ minutes] : I have spent [unfilled] minutes of time on the encounter.

## 2021-08-13 NOTE — ADDENDUM
[FreeTextEntry1] : I, Nati Massey, acted solely as a scribe for Dr. Jackson Goldstein on this date. 08/12/2021.

## 2021-09-10 ENCOUNTER — APPOINTMENT (OUTPATIENT)
Dept: NEUROLOGY | Facility: CLINIC | Age: 66
End: 2021-09-10
Payer: COMMERCIAL

## 2021-09-10 VITALS
HEART RATE: 91 BPM | TEMPERATURE: 97.4 F | OXYGEN SATURATION: 96 % | WEIGHT: 223 LBS | BODY MASS INDEX: 26.88 KG/M2 | HEIGHT: 76.5 IN | SYSTOLIC BLOOD PRESSURE: 123 MMHG | DIASTOLIC BLOOD PRESSURE: 77 MMHG

## 2021-09-10 DIAGNOSIS — G62.9 POLYNEUROPATHY, UNSPECIFIED: ICD-10-CM

## 2021-09-10 PROCEDURE — 99205 OFFICE O/P NEW HI 60 MIN: CPT

## 2021-10-06 PROBLEM — G62.9 PERIPHERAL NEUROPATHY: Status: ACTIVE | Noted: 2021-10-06

## 2021-10-06 NOTE — PHYSICAL EXAM
[Person] : oriented to person [Place] : oriented to place [Time] : oriented to time [Span Intact] : the attention span was normal [Concentration Intact] : normal concentrating ability [Right Foot Was Examined] : right foot was examined [Left Foot Was Examined] : left foot was examined [Normal Appearance] : normal in appearance [Normal in Appearance] : normal in appearance [Normal] : normal [Vibration Dec.] : diminished vibratory sensation at the level of the toes [General Appearance - Alert] : alert [General Appearance - In No Acute Distress] : in no acute distress [Oriented To Time, Place, And Person] : oriented to person, place, and time [Cranial Nerves Optic (II)] : visual acuity intact bilaterally,  visual fields full to confrontation, pupils equal round and reactive to light [Cranial Nerves Oculomotor (III)] : extraocular motion intact [Cranial Nerves Trigeminal (V)] : facial sensation intact symmetrically [Cranial Nerves Facial (VII)] : face symmetrical [Cranial Nerves Vestibulocochlear (VIII)] : hearing was intact bilaterally [Cranial Nerves Accessory (XI - Cranial And Spinal)] : head turning and shoulder shrug symmetric [Motor Tone] : muscle tone was normal in all four extremities [Motor Strength] : muscle strength was normal in all four extremities [Tenderness] : not tender [Erythema] : not erythematous [Swelling] : not swollen [Position Sense Dec.] : normal position sense at the level of the toes

## 2021-10-06 NOTE — HISTORY OF PRESENT ILLNESS
[FreeTextEntry1] :  65 yo male with a history of diabetes presenting complaints of neuropathy in both feet. \par \kate Describes the neuropathy as burning pain, with numbness and tingling, feeling like he has "cotton balls" under his feet, and a feeling that his feet are swollen with pressure "pushing up" into his toes. Sometimes his feet are actually swollen and other times they are not. Says that the neuropathy is worse at night and when he stands up after laying down for a long time. Symptoms fluctuate and are associated with diet with worsening symptoms after eating sugary or high carbohydrate foods. Says he was diagnosed with diabetes 10 years ago and neuropathy 6 years ago. \par \kate Takes gabapentin 1200mg qd and ibuprofen for neuropathy with some relief but has recently been taking the gabapentin less regularly and only when he is having pain. Wants to take as little medication as possible. \par \par Last A1c was 7.1 but believes that it is lower now as his blood glucose has been in good control with levels consistently around 113. Says he has been watching his diet closely. Does not eat beef and is cutting back on bread, dairy, and fried foods. Drinks lots of water and does not drink soda except for an occasional ginger ale for stomach upset. Trying to stop drinking coffee because he can only drink coffee with added sugar. \par \kate Works as a  and is on his feet most of the day for work and is required to where high ankle boots which he thinks makes his neuropathy worse. 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

## 2021-11-17 ENCOUNTER — NON-APPOINTMENT (OUTPATIENT)
Age: 66
End: 2021-11-17

## 2021-12-09 ENCOUNTER — APPOINTMENT (OUTPATIENT)
Dept: ENDOCRINOLOGY | Facility: CLINIC | Age: 66
End: 2021-12-09
Payer: COMMERCIAL

## 2021-12-09 ENCOUNTER — APPOINTMENT (OUTPATIENT)
Dept: NEUROLOGY | Facility: CLINIC | Age: 66
End: 2021-12-09

## 2021-12-09 VITALS
HEART RATE: 96 BPM | WEIGHT: 222 LBS | BODY MASS INDEX: 26.67 KG/M2 | DIASTOLIC BLOOD PRESSURE: 73 MMHG | SYSTOLIC BLOOD PRESSURE: 116 MMHG

## 2021-12-09 PROCEDURE — 82962 GLUCOSE BLOOD TEST: CPT

## 2021-12-09 PROCEDURE — 99214 OFFICE O/P EST MOD 30 MIN: CPT | Mod: 25

## 2021-12-09 NOTE — PHYSICAL EXAM
[Alert] : alert [No Acute Distress] : no acute distress [Normal Sclera/Conjunctiva] : normal sclera/conjunctiva [Normal Outer Ear/Nose] : the ears and nose were normal in appearance [No Neck Mass] : no neck mass was observed [Thyroid Not Enlarged] : the thyroid was not enlarged [No Respiratory Distress] : no respiratory distress [Clear to Auscultation] : lungs were clear to auscultation bilaterally [Normal S1, S2] : normal S1 and S2 [Normal Rate] : heart rate was normal [Normal Bowel Sounds] : normal bowel sounds [Normal Gait] : normal gait [2+] : 2+ in the dorsalis pedis [Vibration Dec.] : diminished vibratory sensation at the level of the toes [Diminished Throughout Both Feet] : diminished tactile sensation with monofilament testing throughout both feet [Normal Reflexes] : deep tendon reflexes were 2+ and symmetric [Oriented x3] : oriented to person, place, and time [No Rash] : no rash

## 2021-12-10 LAB
ALBUMIN SERPL ELPH-MCNC: 4.5 G/DL
ALP BLD-CCNC: 76 U/L
ALT SERPL-CCNC: 29 U/L
ANION GAP SERPL CALC-SCNC: 12 MMOL/L
AST SERPL-CCNC: 27 U/L
BILIRUB SERPL-MCNC: 0.3 MG/DL
BUN SERPL-MCNC: 14 MG/DL
CALCIUM SERPL-MCNC: 9.7 MG/DL
CHLORIDE SERPL-SCNC: 105 MMOL/L
CHOLEST SERPL-MCNC: 118 MG/DL
CO2 SERPL-SCNC: 23 MMOL/L
CREAT SERPL-MCNC: 1.05 MG/DL
CREAT SPEC-SCNC: 74 MG/DL
ESTIMATED AVERAGE GLUCOSE: 197 MG/DL
FOLATE SERPL-MCNC: 17.5 NG/ML
GLUCOSE BLDC GLUCOMTR-MCNC: 256
GLUCOSE SERPL-MCNC: 239 MG/DL
HBA1C MFR BLD HPLC: 8.5 %
HDLC SERPL-MCNC: 43 MG/DL
LDLC SERPL CALC-MCNC: 45 MG/DL
MICROALBUMIN 24H UR DL<=1MG/L-MCNC: <1.2 MG/DL
MICROALBUMIN/CREAT 24H UR-RTO: NORMAL MG/G
NONHDLC SERPL-MCNC: 76 MG/DL
POTASSIUM SERPL-SCNC: 4.6 MMOL/L
PROT SERPL-MCNC: 7.3 G/DL
SODIUM SERPL-SCNC: 140 MMOL/L
TRIGL SERPL-MCNC: 157 MG/DL
VIT B12 SERPL-MCNC: 962 PG/ML

## 2021-12-15 ENCOUNTER — RX RENEWAL (OUTPATIENT)
Age: 66
End: 2021-12-15

## 2021-12-15 NOTE — END OF VISIT
[Time Spent: ___ minutes] : I have spent [unfilled] minutes of time on the encounter. [FreeTextEntry3] : All medical record entries made by the Scribe were at my, Dr. Jackson Goldstein, direction and personally dictated by me on 12/09/2021. I have reviewed the chart and agree that the record accurately reflects my personal performance of the history, physical exam, assessment and plan. I have also personally directed, reviewed and agreed with the chart.

## 2021-12-15 NOTE — ASSESSMENT
[Carbohydrate Consistent Diet] : carbohydrate consistent diet [Importance of Diet and Exercise] : importance of diet and exercise to improve glycemic control, achieve weight loss and improve cardiovascular health [Self Monitoring of Blood Glucose] : self monitoring of blood glucose [FreeTextEntry1] : 65 y/o M pt with:\par \par 1. T2DM (dx in ~2009) with DM related complications of peripheral neuropathy:\par DM is worsening since 12/2020. He had an abdominal hernia surgery a few months ago and he is complaining of tiredness. His FBS is in the 140s. He recently went to see a different endocrinologist who restarted Jardiance. However, he cannot take Jardiance because of frequent urination . He has lost a coupe of lbs. In the past, Ozempic was discontinued because patient associate the back pain with Ozempic however back pain proceed Ozempic injections, therefore we will restart GLP-1 weekly injections. Continue with Glimepiride 4 mg. \par Pt will exercise with his meals. He does not like to continue with Jardiance or Metformin.\par Labs today. I am providing my direct contact number temporarily. \par He has an appointment with the neurologist. \par \par \par Return in: 2 months
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
## 2021-12-15 NOTE — REVIEW OF SYSTEMS
[Recent Weight Loss (___ Lbs)] : recent weight loss: [unfilled] lbs [Negative] : Heme/Lymph [Fatigue] : fatigue [Polyuria] : polyuria [As Noted in HPI] : as noted in HPI [Lower Back Pain] : lower back pain [FreeTextEntry2] : in 3 months.

## 2021-12-15 NOTE — ADDENDUM
[FreeTextEntry1] : I Ruslan Taj act soley as a scribe for Dr. Jackson Goldstein on this date. 12/09/2021

## 2021-12-20 ENCOUNTER — APPOINTMENT (OUTPATIENT)
Dept: ENDOCRINOLOGY | Facility: CLINIC | Age: 66
End: 2021-12-20

## 2021-12-30 ENCOUNTER — APPOINTMENT (OUTPATIENT)
Dept: UROLOGY | Facility: CLINIC | Age: 66
End: 2021-12-30

## 2022-02-10 ENCOUNTER — APPOINTMENT (OUTPATIENT)
Dept: UROLOGY | Facility: CLINIC | Age: 67
End: 2022-02-10
Payer: COMMERCIAL

## 2022-02-10 VITALS — DIASTOLIC BLOOD PRESSURE: 72 MMHG | HEART RATE: 96 BPM | TEMPERATURE: 97.6 F | SYSTOLIC BLOOD PRESSURE: 105 MMHG

## 2022-02-10 PROCEDURE — 99214 OFFICE O/P EST MOD 30 MIN: CPT

## 2022-02-10 NOTE — HISTORY OF PRESENT ILLNESS
[FreeTextEntry1] : prev neg hematuria workup\par nocturia x 3\par +frequecy\par good FOS\par no urgency\par no hematurai\par 2/2020 PSA 1.08

## 2022-02-11 LAB
APPEARANCE: CLEAR
BACTERIA: NEGATIVE
BILIRUBIN URINE: NEGATIVE
BLOOD URINE: NEGATIVE
COLOR: NORMAL
GLUCOSE QUALITATIVE U: ABNORMAL
HYALINE CASTS: 0 /LPF
KETONES URINE: NEGATIVE
LEUKOCYTE ESTERASE URINE: NEGATIVE
MICROSCOPIC-UA: NORMAL
NITRITE URINE: NEGATIVE
PH URINE: 5.5
PROTEIN URINE: NEGATIVE
PSA SERPL-MCNC: 0.88 NG/ML
RED BLOOD CELLS URINE: 0 /HPF
SPECIFIC GRAVITY URINE: 1.04
SQUAMOUS EPITHELIAL CELLS: 0 /HPF
UROBILINOGEN URINE: NORMAL
WHITE BLOOD CELLS URINE: 0 /HPF

## 2022-02-14 LAB — BACTERIA UR CULT: NORMAL

## 2022-03-16 ENCOUNTER — APPOINTMENT (OUTPATIENT)
Dept: ENDOCRINOLOGY | Facility: CLINIC | Age: 67
End: 2022-03-16
Payer: COMMERCIAL

## 2022-03-16 VITALS
HEART RATE: 104 BPM | BODY MASS INDEX: 27.03 KG/M2 | WEIGHT: 225 LBS | SYSTOLIC BLOOD PRESSURE: 145 MMHG | DIASTOLIC BLOOD PRESSURE: 90 MMHG

## 2022-03-16 PROCEDURE — 99213 OFFICE O/P EST LOW 20 MIN: CPT | Mod: 25

## 2022-03-16 PROCEDURE — 82962 GLUCOSE BLOOD TEST: CPT

## 2022-03-16 NOTE — PHYSICAL EXAM
[Alert] : alert [No Acute Distress] : no acute distress [Normal Sclera/Conjunctiva] : normal sclera/conjunctiva [Normal Outer Ear/Nose] : the ears and nose were normal in appearance [No Neck Mass] : no neck mass was observed [Thyroid Not Enlarged] : the thyroid was not enlarged [No Respiratory Distress] : no respiratory distress [Clear to Auscultation] : lungs were clear to auscultation bilaterally [Normal S1, S2] : normal S1 and S2 [Normal Rate] : heart rate was normal [Normal Bowel Sounds] : normal bowel sounds [Normal Gait] : normal gait [No Rash] : no rash [2+] : 2+ in the dorsalis pedis [Vibration Dec.] : diminished vibratory sensation at the level of the toes [Diminished Throughout Both Feet] : diminished tactile sensation with monofilament testing throughout both feet [Normal Reflexes] : deep tendon reflexes were 2+ and symmetric [Oriented x3] : oriented to person, place, and time

## 2022-03-18 ENCOUNTER — APPOINTMENT (OUTPATIENT)
Dept: ENDOCRINOLOGY | Facility: CLINIC | Age: 67
End: 2022-03-18

## 2022-03-18 LAB — GLUCOSE BLDC GLUCOMTR-MCNC: 233

## 2022-03-18 NOTE — ADDENDUM
[FreeTextEntry1] : I Ruslan Taj act soley as a scribe for Dr. Jackson Goldstein on this date. 03/16/2022

## 2022-03-18 NOTE — REVIEW OF SYSTEMS
[Recent Weight Loss (___ Lbs)] : recent weight loss: [unfilled] lbs [Polyuria] : polyuria [Lower Back Pain] : lower back pain [As Noted in HPI] : as noted in HPI [Blurred Vision] : blurred vision [Negative] : Musculoskeletal [Fever] : no fever [FreeTextEntry2] : He lost 3 lbs in 3 months.

## 2022-03-18 NOTE — ASSESSMENT
[FreeTextEntry1] : 66 y/o M pt with:\par \par 1. T2DM diagnosed in ~2009, with DM related complications of peripheral neuropathy:\par Pt is on SGLT-2, GLP-1 and Glimepiride. He states that his recent BS has been fluctuating and he sees numbers in the 300s. \par Pt endorses symptoms of osmotic diuresis. He denies fever and significant weight loss. \par Order blood test today. \par Pt will check BS pre and post prandially.\par \par Return in: 2 days.  [Carbohydrate Consistent Diet] : carbohydrate consistent diet [Importance of Diet and Exercise] : importance of diet and exercise to improve glycemic control, achieve weight loss and improve cardiovascular health [Exercise/Effect on Glucose] : exercise/effect on glucose [Self Monitoring of Blood Glucose] : self monitoring of blood glucose

## 2022-03-18 NOTE — END OF VISIT
[FreeTextEntry3] : All medical record entries made by the Scribe were at my, Dr. Jackson Goldstein, direction and personally dictated by me on 03/16/2022. I have reviewed the chart and agree that the record accurately reflects my personal performance of the history, physical exam, assessment and plan. I have also personally directed, reviewed and agreed with the chart.  [Time Spent: ___ minutes] : I have spent [unfilled] minutes of time on the encounter.

## 2022-03-18 NOTE — THERAPY
[Today's Date] : [unfilled] [FreeTextEntry7] : Glimepiride 4 mg, Ozempic 0.5 mg qw, Farxiga, Cholesterol pills (can't remember).
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
## 2022-03-18 NOTE — DATA REVIEWED
[FreeTextEntry1] : Labs: \par - 08/17/21: A1c 8.2% (H), s.creat 1.07, LDL-c 51\par - 04/16/21: A1c 8.1%, s.creat 1.07, LDL-c 52, Alb/Creat 5\par - 12/28/20: A1c 6.7%, s.creat 0.96, Cholesterol 207, LDL-c 142, Ca 10.0\par - 08/17/20: A1c 6.7%, s.creat 1.24, Cholesterol 210, LDL-c 138, , Ca 10.0\par - 01/08/20: POCT A1c 6.8%, \par - 07/18/19: A1c 7.8%, s.creat 1.19, Cholesterol 232, LDL-c 170, urine no protein, TSH 0.91, Free T4 1.3 \par \par

## 2022-03-22 ENCOUNTER — APPOINTMENT (OUTPATIENT)
Dept: UROLOGY | Facility: CLINIC | Age: 67
End: 2022-03-22
Payer: COMMERCIAL

## 2022-03-22 VITALS — HEART RATE: 92 BPM | DIASTOLIC BLOOD PRESSURE: 79 MMHG | TEMPERATURE: 97.6 F | SYSTOLIC BLOOD PRESSURE: 118 MMHG

## 2022-03-22 PROCEDURE — 99213 OFFICE O/P EST LOW 20 MIN: CPT

## 2022-03-23 NOTE — HISTORY OF PRESENT ILLNESS
[FreeTextEntry1] : returns for follow up\par mild improvement with flomax\par minimal bother\par PSA 0.88\par UA negative

## 2022-04-29 ENCOUNTER — APPOINTMENT (OUTPATIENT)
Dept: UROLOGY | Facility: CLINIC | Age: 67
End: 2022-04-29
Payer: COMMERCIAL

## 2022-04-29 VITALS — SYSTOLIC BLOOD PRESSURE: 118 MMHG | DIASTOLIC BLOOD PRESSURE: 79 MMHG | HEART RATE: 89 BPM | TEMPERATURE: 98.2 F

## 2022-04-29 PROCEDURE — 99214 OFFICE O/P EST MOD 30 MIN: CPT

## 2022-04-29 NOTE — ASSESSMENT
[FreeTextEntry1] : BPH with  Nocturia\par reviewed treatment options, TURP, Rezum, Urolift\par interest in procedure, will call to decide\par Resume Tamsulosin, increase dose to 0.8 mg\par Trial Finasteride 5 mg, informed of risk, benefit and alteratives \par \par Follow up in 3 months

## 2022-04-29 NOTE — PHYSICAL EXAM
[General Appearance - Well Nourished] : well nourished [General Appearance - Well Developed] : well developed [Normal Appearance] : normal appearance [Well Groomed] : well groomed [General Appearance - In No Acute Distress] : no acute distress [Abdomen Soft] : soft [Abdomen Tenderness] : non-tender [Costovertebral Angle Tenderness] : no ~M costovertebral angle tenderness [Urethral Meatus] : meatus normal [Urinary Bladder Findings] : the bladder was normal on palpation [Scrotum] : the scrotum was normal [Testes Mass (___cm)] : there were no testicular masses [Edema] : no peripheral edema [] : no respiratory distress [Respiration, Rhythm And Depth] : normal respiratory rhythm and effort [Exaggerated Use Of Accessory Muscles For Inspiration] : no accessory muscle use [Oriented To Time, Place, And Person] : oriented to person, place, and time [Affect] : the affect was normal [Mood] : the mood was normal [Not Anxious] : not anxious [Normal Station and Gait] : the gait and station were normal for the patient's age [No Focal Deficits] : no focal deficits [No Palpable Adenopathy] : no palpable adenopathy [FreeTextEntry1] : D

## 2022-04-29 NOTE — HISTORY OF PRESENT ILLNESS
[Urinary Retention] : urinary retention [Urinary Urgency] : urinary urgency [Urinary Frequency] : urinary frequency [Nocturia] : nocturia [Post-Void Dribbling] : post-void dribbling [FreeTextEntry1] : 67 yr old male with hx of HTN, HLD, DM2, s/p left inguinal hernia repair 3/2021, ED, BPH, \par return for follow up \par Complains of urgency , frequency  and nocturia X7, \par Reports good urinary FOS, \par Self d/c'd Tamsulosin 0.4 mg last week \par bothersome symptoms\par PSA 0.88 Feb 2022\par         1.08 feb 2020 \par Denies dysuria, hematuria or flank pain \par \par Social hx: 20 pack yr smoker, quit 11 yrs ago, denies alcohol\par FHx: father prostate cancer, mother - throat smoker \par \par

## 2022-07-08 ENCOUNTER — APPOINTMENT (OUTPATIENT)
Dept: ENDOCRINOLOGY | Facility: CLINIC | Age: 67
End: 2022-07-08

## 2022-07-08 VITALS
WEIGHT: 218 LBS | DIASTOLIC BLOOD PRESSURE: 70 MMHG | BODY MASS INDEX: 26.27 KG/M2 | HEIGHT: 76.5 IN | HEART RATE: 93 BPM | OXYGEN SATURATION: 98 % | TEMPERATURE: 97.6 F | SYSTOLIC BLOOD PRESSURE: 109 MMHG

## 2022-07-08 LAB — HBA1C MFR BLD HPLC: 9

## 2022-07-08 PROCEDURE — 83036 HEMOGLOBIN GLYCOSYLATED A1C: CPT | Mod: QW

## 2022-07-08 PROCEDURE — 99205 OFFICE O/P NEW HI 60 MIN: CPT | Mod: 25

## 2022-07-08 PROCEDURE — 99215 OFFICE O/P EST HI 40 MIN: CPT | Mod: 25

## 2022-07-08 RX ORDER — BLOOD-GLUCOSE METER
70 EACH MISCELLANEOUS
Qty: 1 | Refills: 3 | Status: ACTIVE | COMMUNITY
Start: 2022-07-08 | End: 1900-01-01

## 2022-07-08 RX ORDER — METFORMIN HYDROCHLORIDE 1000 MG/1
1000 TABLET, COATED ORAL
Qty: 2 | Refills: 3 | Status: DISCONTINUED | COMMUNITY
End: 2022-07-08

## 2022-07-08 NOTE — ASSESSMENT
[Diabetes Foot Care] : diabetes foot care [Long Term Vascular Complications] : long term vascular complications of diabetes [Carbohydrate Consistent Diet] : carbohydrate consistent diet [Importance of Diet and Exercise] : importance of diet and exercise to improve glycemic control, achieve weight loss and improve cardiovascular health [Hypoglycemia Management] : hypoglycemia management [Action and use of Insulin] : action and use of short and long-acting insulin [Self Monitoring of Blood Glucose] : self monitoring of blood glucose [Insulin Self-Administration] : insulin self-administration [Injection Technique, Storage, Sharps Disposal] : injection technique, storage, and sharps disposal [Retinopathy Screening] : Patient was referred to ophthalmology for retinopathy screening [FreeTextEntry1] : Patient is a 66 yo man with uncontrolled T2DM, HTN and HLD establishing new endocrine care today\par \par 1. Uncontrolled Type 2 DM\par -discussed the risks of micro/macrovascular events including, but not limited to, heart attack/stroke/eye complications/kidney disease at length\par -importance of glycemic control discussed; goal A1c of 7%\par -nutritional counseling recommended and provided today. On food recall, he admits to outside eating and has cut back recently, snacks a lot of peanuts/pretzels. Educated to avoid snacking on starches and healthy plate diet discussed\par -referral for nutritionist given today\par -importance of self-monitoring of blood glucose also discussed.  Goal fasting glucose 100-130 with 2 hour post-prandial goals < 180\par -dilated eye exam reportedly up to date\par -monofilament testing done\par -continue farxiga, ozempic, glimepiride.  Start Lantus conservative dose of 18 units daily. Hypoglycemia education provided. If any symptoms, educted to check sugar.  If < 70, drink juice and call office while stopping glimepiride.\par -Rx for insulin provided\par -RN teaching for safe insulin injection\par \par 2. HTN\par -BP goal < 140/90\par \par 3. HLD\par -LDL goal < 70\par -statin\par \par Follow up in 3 months. Call with hypo/hyperglycemic excursions\par

## 2022-07-08 NOTE — HISTORY OF PRESENT ILLNESS
[FreeTextEntry1] : Patient is a 68 yo man with uncontrolled T2DM, HTN and HLD establishing new endocrine care. Previous seen by both Dr. Levy and Dr. Goldstein\par \par Diagnosed with diabetes around 2009.  Related complications include peripheral neuropathy\par Has been on several medicines including jardiance but developed urinary frequency and ozempic which resulted in low back pain. Ozempic was restarted\par Currently adheres to glimepiride 4 mg once a day, ozempic once a week and farxiga.  Was taking repaglinide once and was told to stop by PCP Dr. Clinton\par Does not want metformin based on research that he has done and reports it caused erectile dysfunction\par SMBG every other day: 150-270; rare 300s\par Breakfast: oatmeal with nuts and raisins; eggs with guacamole; eggs with turkey hinds; water\par Lunch: buys salads\par Dinner: fish, chicken breast with brown rice, lots of veggies\par Does not eat burgers\par Orders Chinese food about once a week broccoli with garlic\par Stopped eating meat\par Cut back on eating out two weeks ago\par Does not drink soda\par No juice\par Eats potato chips: 1-2 times a week; pretzels\par Sweet: stopped eating sweets; M and M's once every three weeks or once a month\par Dilated eye exam: < 6 months, no reported complications

## 2022-07-08 NOTE — PHYSICAL EXAM
[Alert] : alert [Well Nourished] : well nourished [No Acute Distress] : no acute distress [EOMI] : extra ocular movement intact [Normal Hearing] : hearing was normal [No Respiratory Distress] : no respiratory distress [No Accessory Muscle Use] : no accessory muscle use [Clear to Auscultation] : lungs were clear to auscultation bilaterally [Normal S1, S2] : normal S1 and S2 [Normal Rate] : heart rate was normal [Normal Bowel Sounds] : normal bowel sounds [Soft] : abdomen soft [Normal Gait] : normal gait [Normal Strength/Tone] : muscle strength and tone were normal [Right foot was examined, including] : right foot ~C was examined, including visual inspection with sensory and pulse exams [Left foot was examined, including] : left foot ~C was examined, including visual inspection with sensory and pulse exams [2+] : 2+ in the dorsalis pedis [No Motor Deficits] : the motor exam was normal [Normal Affect] : the affect was normal [Normal Insight/Judgement] : insight and judgment were intact [Normal Mood] : the mood was normal [Foot Ulcers] : no foot ulcers [#1 Diminished] : number 1 was normal [#2 Diminished] : number 2 was normal [#3 Diminished] : number 3 was normal [#4 Diminished] : number 4 was normal [#5 Diminished] : number 5 was normal [#6 Diminished] : number 6 was normal [#7 Diminished] : number 7 was normal [#8 Diminished] : number 8 was normal [#9 Diminished] : number 9 was normal [#10 Diminished] : number 10 was normal [de-identified] : cervical lipoma

## 2022-07-08 NOTE — REVIEW OF SYSTEMS
[Polyuria] : polyuria [Fatigue] : no fatigue [Decreased Appetite] : appetite not decreased [Visual Field Defect] : no visual field defect [Dysphagia] : no dysphagia [Dysphonia] : no dysphonia [Chest Pain] : no chest pain [Slow Heart Rate] : heart rate is not slow [Palpitations] : no palpitations [Fast Heart Rate] : heart rate is not fast [Shortness Of Breath] : no shortness of breath [Cough] : no cough [Nausea] : no nausea [Constipation] : no constipation [Diarrhea] : no diarrhea [Headaches] : no headaches [Tremors] : no tremors

## 2022-07-29 ENCOUNTER — APPOINTMENT (OUTPATIENT)
Dept: UROLOGY | Facility: CLINIC | Age: 67
End: 2022-07-29

## 2022-10-14 ENCOUNTER — APPOINTMENT (OUTPATIENT)
Dept: SURGERY | Facility: CLINIC | Age: 67
End: 2022-10-14

## 2022-10-14 VITALS
HEIGHT: 76.5 IN | HEART RATE: 76 BPM | SYSTOLIC BLOOD PRESSURE: 106 MMHG | WEIGHT: 214 LBS | TEMPERATURE: 97.3 F | OXYGEN SATURATION: 99 % | BODY MASS INDEX: 25.79 KG/M2 | DIASTOLIC BLOOD PRESSURE: 72 MMHG

## 2022-10-14 DIAGNOSIS — R22.31 LOCALIZED SWELLING, MASS AND LUMP, RIGHT UPPER LIMB: ICD-10-CM

## 2022-10-14 DIAGNOSIS — R22.32 LOCALIZED SWELLING, MASS AND LUMP, LEFT UPPER LIMB: ICD-10-CM

## 2022-10-14 DIAGNOSIS — M75.100 UNSPECIFIED ROTATOR CUFF TEAR OR RUPTURE OF UNSPECIFIED SHOULDER, NOT SPECIFIED AS TRAUMATIC: ICD-10-CM

## 2022-10-14 PROCEDURE — 99203 OFFICE O/P NEW LOW 30 MIN: CPT

## 2022-10-19 RX ORDER — SEMAGLUTIDE 1.34 MG/ML
2 INJECTION, SOLUTION SUBCUTANEOUS
Qty: 1 | Refills: 0 | Status: DISCONTINUED | COMMUNITY
Start: 2022-07-08 | End: 2022-10-19

## 2022-10-20 PROBLEM — R22.32 MASS OF LEFT UPPER EXTREMITY: Status: ACTIVE | Noted: 2019-03-11

## 2022-10-20 PROBLEM — R22.31 MASS OF RIGHT FOREARM: Status: ACTIVE | Noted: 2019-03-11

## 2022-10-24 ENCOUNTER — APPOINTMENT (OUTPATIENT)
Dept: ENDOCRINOLOGY | Facility: CLINIC | Age: 67
End: 2022-10-24

## 2022-10-24 VITALS
TEMPERATURE: 97.8 F | HEART RATE: 83 BPM | BODY MASS INDEX: 26.63 KG/M2 | SYSTOLIC BLOOD PRESSURE: 122 MMHG | HEIGHT: 76.5 IN | WEIGHT: 221 LBS | DIASTOLIC BLOOD PRESSURE: 79 MMHG | OXYGEN SATURATION: 99 %

## 2022-10-24 PROCEDURE — 99214 OFFICE O/P EST MOD 30 MIN: CPT | Mod: 25

## 2022-10-24 PROCEDURE — 83036 HEMOGLOBIN GLYCOSYLATED A1C: CPT | Mod: QW

## 2022-10-24 NOTE — HISTORY OF PRESENT ILLNESS
[FreeTextEntry1] : Patient is a 66 yo man with uncontrolled T2DM, HTN and HLD here for diabetes\par \par Diagnosed with diabetes around 2009. Related complications include peripheral neuropathy\par Has been on several medicines including jardiance (developed urinary frequency) and ozempic (resulted in low back pain). Ozempic was restarted.  Does not want metformin based on research that he has done and reports it caused erectile dysfunction\par Was taking repaglinide once and was told to stop by PCP Dr. Clinton\par Currently adheres to ozempic once a week, glimepiride and farxiga. Basal Lantus 18 units daily was started in July for an A1c of 9%\par Ran out of ozempic this week\par SMBG every other day: 110-165; lowest glucose levels were 80\par Breakfast: oatmeal with nuts and raisins; eggs with guacamole; eggs with turkey hinds; water\par Lunch: buys salads\par Dinner: fish, chicken breast with brown rice, lots of veggies\par Does not eat burgers\par He cut back on fruits and bread.  Limits rice a swell.\par Still snacks on pretzels but less than before.  \par Does not drink soda\par No juice\par Sweet: stopped eating sweets; M and M's once every three weeks or once a month\par Dilated eye exam: < 6 months, no reported complications \par He has scheduled surgery for left upper arm lipoma removal.

## 2022-10-24 NOTE — REVIEW OF SYSTEMS
[Fatigue] : no fatigue [Decreased Appetite] : appetite not decreased [Dysphagia] : no dysphagia [Dysphonia] : no dysphonia [Chest Pain] : no chest pain [Palpitations] : no palpitations [Shortness Of Breath] : no shortness of breath [Nausea] : no nausea [Constipation] : no constipation [Vomiting] : no vomiting [Headaches] : no headaches [Tremors] : no tremors [Depression] : no depression

## 2022-10-24 NOTE — PHYSICAL EXAM
[Alert] : alert [Well Nourished] : well nourished [No Acute Distress] : no acute distress [EOMI] : extra ocular movement intact [Normal Hearing] : hearing was normal [No Respiratory Distress] : no respiratory distress [No Accessory Muscle Use] : no accessory muscle use [Clear to Auscultation] : lungs were clear to auscultation bilaterally [Normal S1, S2] : normal S1 and S2 [Normal Rate] : heart rate was normal [Normal Bowel Sounds] : normal bowel sounds [Soft] : abdomen soft [Normal Gait] : normal gait [Normal Strength/Tone] : muscle strength and tone were normal [No Motor Deficits] : the motor exam was normal [Normal Affect] : the affect was normal [Normal Insight/Judgement] : insight and judgment were intact [Normal Mood] : the mood was normal [de-identified] : cervical lipoma

## 2022-10-24 NOTE — ASSESSMENT
[FreeTextEntry1] : Patient is a 68 yo man with T2DM, HTN and HLD here for follow up\par \par 1. Type 2 DM\par -POCT A1c today is 6.9% and at goal, confirm with serum levels. If serum glucose levels are < 7.5% he is optimizes from the diabetes standpoint for surgery\par -discussed the risks of micro/macrovascular events including, but not limited to, heart attack/stroke/eye complications/kidney disease at length\par -importance of glycemic control discussed; goal A1c of 7%\par -nutritional counseling provided. He has made some good changes to his diet such as less snacks, healthier foods, and limiting carbohydrates\par -importance of self-monitoring of blood glucose also discussed. Goal fasting glucose 100-130 with 2 hour post-prandial goals < 180\par -dilated eye exam reportedly up to date\par -monofilament testing done\par -continue farxiga, ozempic, glimepiride and Lantus 18 units daily. Hypoglycemia education provided. There is no reported hypoglycemia\par -educated that if he goes in for scheduled surgery, he should STOP the farxiga 3-5 days before surgery and can restart once he is discharged home. He will let me know when the surgery is and I will provide specific instructions for lantus at that time\par -if A1c remains controlled in 3 months, I will discontinue glimepiride.  After that, we will focus on titrating off of insulin\par \par 2. HTN\par -BP goal < 140/90\par \par 3. HLD\par -LDL goal < 70\par -statin\par \par 3 month follow up [Diabetes Foot Care] : diabetes foot care [Long Term Vascular Complications] : long term vascular complications of diabetes [Carbohydrate Consistent Diet] : carbohydrate consistent diet [Importance of Diet and Exercise] : importance of diet and exercise to improve glycemic control, achieve weight loss and improve cardiovascular health [Hypoglycemia Management] : hypoglycemia management [Action and use of Insulin] : action and use of short and long-acting insulin [Self Monitoring of Blood Glucose] : self monitoring of blood glucose [Insulin Self-Administration] : insulin self-administration [Injection Technique, Storage, Sharps Disposal] : injection technique, storage, and sharps disposal [Retinopathy Screening] : Patient was referred to ophthalmology for retinopathy screening

## 2022-10-25 LAB
ANION GAP SERPL CALC-SCNC: 11 MMOL/L
BUN SERPL-MCNC: 11 MG/DL
CALCIUM SERPL-MCNC: 9.7 MG/DL
CHLORIDE SERPL-SCNC: 103 MMOL/L
CHOLEST SERPL-MCNC: 117 MG/DL
CO2 SERPL-SCNC: 27 MMOL/L
CREAT SERPL-MCNC: 1.07 MG/DL
CREAT SPEC-SCNC: 104 MG/DL
EGFR: 77 ML/MIN/1.73M2
ESTIMATED AVERAGE GLUCOSE: 169 MG/DL
GLUCOSE SERPL-MCNC: 187 MG/DL
HBA1C MFR BLD HPLC: 6.9
HBA1C MFR BLD HPLC: 7.5 %
HDLC SERPL-MCNC: 44 MG/DL
LDLC SERPL CALC-MCNC: 47 MG/DL
MICROALBUMIN 24H UR DL<=1MG/L-MCNC: <1.2 MG/DL
MICROALBUMIN/CREAT 24H UR-RTO: NORMAL MG/G
NONHDLC SERPL-MCNC: 72 MG/DL
POTASSIUM SERPL-SCNC: 4.7 MMOL/L
SODIUM SERPL-SCNC: 142 MMOL/L
TRIGL SERPL-MCNC: 128 MG/DL

## 2022-10-26 RX ORDER — SEMAGLUTIDE 1.34 MG/ML
2 INJECTION, SOLUTION SUBCUTANEOUS
Qty: 3 | Refills: 1 | Status: DISCONTINUED | COMMUNITY
Start: 2019-07-18 | End: 2022-10-26

## 2022-10-28 ENCOUNTER — NON-APPOINTMENT (OUTPATIENT)
Age: 67
End: 2022-10-28

## 2022-10-31 ENCOUNTER — APPOINTMENT (OUTPATIENT)
Dept: OTOLARYNGOLOGY | Facility: CLINIC | Age: 67
End: 2022-10-31

## 2022-10-31 VITALS
HEIGHT: 76.5 IN | WEIGHT: 217 LBS | DIASTOLIC BLOOD PRESSURE: 92 MMHG | BODY MASS INDEX: 26.15 KG/M2 | TEMPERATURE: 96.1 F | SYSTOLIC BLOOD PRESSURE: 127 MMHG | HEART RATE: 84 BPM

## 2022-10-31 PROCEDURE — 99204 OFFICE O/P NEW MOD 45 MIN: CPT

## 2022-10-31 RX ORDER — CAPSAICIN 0.1 G/100G
0.1 CREAM TOPICAL
Qty: 3 | Refills: 1 | Status: COMPLETED | COMMUNITY
Start: 2020-01-08 | End: 2022-10-31

## 2022-10-31 RX ORDER — BLOOD SUGAR DIAGNOSTIC
STRIP MISCELLANEOUS TWICE DAILY
Qty: 180 | Refills: 3 | Status: COMPLETED | COMMUNITY
Start: 2020-01-08 | End: 2022-10-31

## 2022-10-31 RX ORDER — LOSARTAN POTASSIUM 25 MG/1
25 TABLET, FILM COATED ORAL
Refills: 0 | Status: COMPLETED | COMMUNITY
End: 2022-10-31

## 2022-10-31 RX ORDER — SACUBITRIL AND VALSARTAN 49; 51 MG/1; MG/1
49-51 TABLET, FILM COATED ORAL
Qty: 60 | Refills: 0 | Status: ACTIVE | COMMUNITY
Start: 2022-09-22

## 2022-10-31 RX ORDER — FLASH GLUCOSE SENSOR
KIT MISCELLANEOUS
Qty: 4 | Refills: 3 | Status: COMPLETED | COMMUNITY
Start: 2022-07-08 | End: 2022-10-31

## 2022-10-31 RX ORDER — TAMSULOSIN HYDROCHLORIDE 0.4 MG/1
0.4 CAPSULE ORAL
Qty: 30 | Refills: 11 | Status: COMPLETED | COMMUNITY
Start: 2022-02-10 | End: 2022-10-31

## 2022-10-31 RX ORDER — DULOXETINE HYDROCHLORIDE 20 MG/1
20 CAPSULE, DELAYED RELEASE PELLETS ORAL
Qty: 60 | Refills: 0 | Status: COMPLETED | COMMUNITY
Start: 2022-09-30

## 2022-10-31 RX ORDER — FINASTERIDE 5 MG/1
5 TABLET, FILM COATED ORAL DAILY
Qty: 90 | Refills: 3 | Status: COMPLETED | COMMUNITY
Start: 2022-04-29 | End: 2022-10-31

## 2022-10-31 RX ORDER — ARIPIPRAZOLE 2 MG/1
2 TABLET ORAL
Qty: 30 | Refills: 0 | Status: ACTIVE | COMMUNITY
Start: 2022-09-30

## 2022-10-31 RX ORDER — FLASH GLUCOSE SCANNING READER
EACH MISCELLANEOUS
Qty: 1 | Refills: 0 | Status: COMPLETED | COMMUNITY
Start: 2022-07-08 | End: 2022-10-31

## 2022-10-31 RX ORDER — ESCITALOPRAM OXALATE 20 MG/1
20 TABLET ORAL
Qty: 30 | Refills: 0 | Status: ACTIVE | COMMUNITY
Start: 2022-08-05

## 2022-10-31 RX ORDER — LOSARTAN POTASSIUM 50 MG/1
50 TABLET, FILM COATED ORAL
Qty: 30 | Refills: 0 | Status: COMPLETED | COMMUNITY
Start: 2022-08-26

## 2022-10-31 RX ORDER — BUPROPION HYDROCHLORIDE 150 MG/1
150 TABLET, EXTENDED RELEASE ORAL
Qty: 30 | Refills: 0 | Status: COMPLETED | COMMUNITY
Start: 2022-08-04

## 2022-10-31 RX ORDER — METOPROLOL SUCCINATE 50 MG/1
50 TABLET, EXTENDED RELEASE ORAL
Qty: 30 | Refills: 0 | Status: ACTIVE | COMMUNITY
Start: 2022-09-22

## 2022-10-31 RX ORDER — LOSARTAN POTASSIUM 100 MG/1
100 TABLET, FILM COATED ORAL
Qty: 30 | Refills: 0 | Status: COMPLETED | COMMUNITY
Start: 2022-02-09

## 2022-10-31 RX ORDER — TAMSULOSIN HYDROCHLORIDE 0.4 MG/1
0.4 CAPSULE ORAL
Qty: 180 | Refills: 3 | Status: COMPLETED | COMMUNITY
Start: 2022-04-29 | End: 2022-10-31

## 2022-10-31 RX ORDER — GLIMEPIRIDE 2 MG/1
2 TABLET ORAL
Qty: 30 | Refills: 4 | Status: COMPLETED | COMMUNITY
Start: 2021-05-05 | End: 2022-10-31

## 2022-10-31 RX ORDER — BUPROPION HYDROCHLORIDE 300 MG/1
300 TABLET, EXTENDED RELEASE ORAL
Qty: 30 | Refills: 0 | Status: ACTIVE | COMMUNITY
Start: 2022-09-14

## 2022-10-31 NOTE — HISTORY OF PRESENT ILLNESS
[de-identified] : Mr. EASTMAN is a 67 year old man who was referred by Dr. Parekh for a neck mass.\par PMH:  DM, HTN, HLD, BPH, peripheral neuropathy, depression, left rotator cuff tear\par PCP: Dr. Judith Clinton.\par \par He noticed painless posterior neck mass for a few years and mass is increasing in size. The mass causes pressure on his neck so his neck gets tired and uncomfortable.  He cannot put his head back too far due to the mass in the way.\par He had US of neck mass in 2020.\par He is to have left upper arm (7.9 cm)and right forearm lipomas removed by Dr. Parekh. He was cleared by Endocrine to have surgery.  He would like to schedule removal of all masses for same OR.\par Smoked cigarettes 1ppd x 15 years, stopped 7-8 years ago. 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 \par - Right submandibular gland is normal in size and homogeneous. Level 2 lymph node measures 1.6 x 0.5 x 1.2 cm. Lymph node has benign sonographic features.\par - Left submandibular gland is normal in size and homogeneous. Level 1 lymph node measures 1.5 x 0.4 x 1.0 cm. Level 2 lymph node measures 1.5 x 0.5 x 1.0 cm. Lymph nodes have benign sonographic features.\par - Posterior palpable neck mass is well-defined and heterogeneous measuring 3.3 x 1.7 x 3.5 cm. No internal vascularity. Findings are not typical for benign lipoma and correlation with CT or MRI recommended both with and without contrast.\par IMPRESSION:  \par 1.) Normal bilateral cervical lymph nodes and submandibular gland.\par 2.) Heterogeneous well-defined solid hypovascular posterior neck mass. Although probably benign such as lipoma, further evaluation with either CT or MRI with and without contrast recommended. \par \par \par

## 2022-10-31 NOTE — PHYSICAL EXAM
[Midline] : trachea located in midline position [Normal] : no rashes [FreeTextEntry1] : No hoarseness [de-identified] : 5 x 5 cm firm nontender in midline at the base of the neck; seems to have movement when manipulated. [de-identified] : Carotid pulses 2+ bilateral.

## 2022-10-31 NOTE — ASSESSMENT
[FreeTextEntry1] : Mr. EASTMAN is a 67-year-old man with progressively enlarging posterior midline neck mass that is consistent with a lipoma on physical examination.  A 2020 ultrasound reported a 3.3 x 3.5 cm posterior neck mass that had characteristics not typical for benign lipoma.  However, a benign lipoma is still more likely than a liposarcoma.\par \par --> CT Neck w/ contrast to better differentiate the mass and to determine its deep extension.\par -->  I recommend removal of posterior neck mass.  We can coordinate an OR date with Dr. Parekh to remove the posterior neck mass along with the arm lipomas at the same time.\par \par Return after CT Neck\par

## 2022-10-31 NOTE — CONSULT LETTER
[Dear  ___] : Dear  [unfilled], [( Thank you for referring [unfilled] for consultation for _____ )] : Thank you for referring [unfilled] for consultation for [unfilled] [Please see my note below.] : Please see my note below. [Consult Closing:] : Thank you very much for allowing me to participate in the care of this patient.  If you have any questions, please do not hesitate to contact me. [Sincerely,] : Sincerely, [DrPurvi  ___] : Dr. HUBER [FreeTextEntry2] : Pedro Parekh MD\par [address] 1[address]\[place] [address] [FreeTextEntry1] : [address] [address] [FreeTextEntry3] : \par Lori Kuo MD \par Otolaryngology, Head and Neck Surgery \par \par \par

## 2022-11-23 ENCOUNTER — NON-APPOINTMENT (OUTPATIENT)
Age: 67
End: 2022-11-23

## 2022-12-07 NOTE — DATA REVIEWED
[FreeTextEntry1] : RA MRI Humerus Left without contrast (5/7/21):\par -Encapsulated subcutaneous lipomatous mass within the area of concern without aggressive imaging features\par -Near full-thickness tear of anterior supraspinatus tendon measuring 1 x 1.6cm and likely partial thickness tear and tenosynovitis of the extra articular long head biceps tendon, suboptimally evaluated.\par \par Date of Exam: 11-\par EXAM:  ULTRASOUND LEFT EXTREMITY NONVASCULAR LIMITED\par HISTORY:  Left upper arm mass\par TECHNIQUE:  An ultrasound of the left upper arm was performed utilizing a high-resolution linear transducer. Grayscale and color Doppler imaging was used.\par COMPARISON:  None\par FINDINGS:  In the left upper arm in the location of the palpable mass is a 6.1 x 1.9 x 6.3 cm well-defined homogeneous mass which appears isoechoic to subcutaneous fat. There is no internal vascularity. \par IMPRESSION: Palpable mass corresponds to a 6.3 cm soft tissue mass. While this may represent a benign lipoma, given its size (greater than 5 cm), further evaluation with an MRI of the left upper arm without and with contrast is advised.\par \par \par Date of Exam: 11-\par  \par EXAM:  ULTRASOUND SOFT TISSUE NECK\par \par HISTORY:  Palpable posterior neck mass.  \par \par TECHNIQUE:  Real time sonographic imaging of the anterior neck is performed to evaluate the thyroid gland. High frequency linear array transducer is utilized to obtain grayscale and color Doppler images. Static images are provided for review. \par \par COMPARISON:  No old study is available. \par \par FINDINGS:  \par Right submandibular gland is normal in size and homogeneous. Level 2 lymph node measures 1.6 x 0.5 x 1.2 cm. Lymph node has benign sonographic features.\par \par Left submandibular gland is normal in size and homogeneous. Level 1 lymph node measures 1.5 x 0.4 x 1.0 cm. Level 2 lymph node measures 1.5 x 0.5 x 1.0 cm. Lymph nodes have benign sonographic features.\par \par Posterior palpable neck mass is well-defined and heterogeneous measuring 3.3 x 1.7 x 3.5 cm. No internal vascularity. Findings are not typical for benign lipoma and correlation with CT or MRI recommended both with and without contrast.\par \par IMPRESSION:  \par 1. Normal bilateral cervical lymph nodes and submandibular gland.\par 2. Heterogeneous well-defined solid hypovascular posterior neck mass. Although probably benign such as lipoma, further evaluation with either CT or MRI with and without contrast recommended. \par

## 2022-12-07 NOTE — ASSESSMENT
[FreeTextEntry1] : 68 y/o male with left deltoid mass and right forearm mass. Wishes to have masses excised. Discussed referral to ENT for eval/management of neck mess, referral provided. On review of MRI, pt found to have left rotator cuff tear, he was unaware of this dx. Orthopedic referral provided. \par \par We discussed the risk benefits and alternatives to excision of the lesion in detail including but not limited to bleeding, infection, death, disability, chronic pain, numbness, scarring, recurrence of lesion, need for additional procedures and other issues. Discussed pt should be evaluated by endocrinology prior to elective surgery for perioperative management of Farxiga. Patient expressed understanding and wishes to proceed with surgery. \par \par Plan:\par -Excision of left deltoid mass and right forearm mass \par -Referral to orthopedics for Left rotator cuff tear \par -Referral to ENT for neck mass

## 2022-12-07 NOTE — PHYSICAL EXAM
[de-identified] : NAD, comfortable [de-identified] : Normocephalic, atraumatic. No scleral icterus.  [de-identified] : Supple, no JVD or cervical lymphadenopathy.  [de-identified] : No respiratory distress  [de-identified] : Warm, dry. The left deltoid has an

## 2022-12-13 RX ORDER — DULAGLUTIDE 1.5 MG/.5ML
1.5 INJECTION, SOLUTION SUBCUTANEOUS
Qty: 3 | Refills: 3 | Status: DISCONTINUED | COMMUNITY
Start: 2022-11-22 | End: 2022-12-13

## 2022-12-22 ENCOUNTER — NON-APPOINTMENT (OUTPATIENT)
Age: 67
End: 2022-12-22

## 2022-12-27 RX ORDER — SILDENAFIL 20 MG/1
20 TABLET ORAL
Qty: 90 | Refills: 3 | Status: ACTIVE | COMMUNITY
Start: 2020-02-27 | End: 1900-01-01

## 2023-01-24 ENCOUNTER — APPOINTMENT (OUTPATIENT)
Dept: ENDOCRINOLOGY | Facility: CLINIC | Age: 68
End: 2023-01-24

## 2023-01-30 ENCOUNTER — APPOINTMENT (OUTPATIENT)
Dept: ENDOCRINOLOGY | Facility: CLINIC | Age: 68
End: 2023-01-30

## 2023-03-31 ENCOUNTER — NON-APPOINTMENT (OUTPATIENT)
Age: 68
End: 2023-03-31

## 2023-03-31 RX ORDER — INSULIN GLARGINE 100 [IU]/ML
100 INJECTION, SOLUTION SUBCUTANEOUS
Qty: 1 | Refills: 0 | Status: DISCONTINUED | COMMUNITY
Start: 2022-07-08 | End: 2023-03-31

## 2023-04-07 ENCOUNTER — APPOINTMENT (OUTPATIENT)
Dept: ENDOCRINOLOGY | Facility: CLINIC | Age: 68
End: 2023-04-07
Payer: COMMERCIAL

## 2023-04-07 VITALS
BODY MASS INDEX: 26.76 KG/M2 | WEIGHT: 222 LBS | OXYGEN SATURATION: 99 % | SYSTOLIC BLOOD PRESSURE: 118 MMHG | HEIGHT: 76.5 IN | HEART RATE: 80 BPM | DIASTOLIC BLOOD PRESSURE: 81 MMHG | TEMPERATURE: 98.2 F

## 2023-04-07 LAB
GLUCOSE BLDC GLUCOMTR-MCNC: 92
HBA1C MFR BLD HPLC: 7.8

## 2023-04-07 PROCEDURE — 83036 HEMOGLOBIN GLYCOSYLATED A1C: CPT | Mod: QW

## 2023-04-07 PROCEDURE — 82962 GLUCOSE BLOOD TEST: CPT

## 2023-04-07 PROCEDURE — 99214 OFFICE O/P EST MOD 30 MIN: CPT | Mod: 25

## 2023-04-07 RX ORDER — DAPAGLIFLOZIN 10 MG/1
10 TABLET, FILM COATED ORAL DAILY
Qty: 90 | Refills: 3 | Status: DISCONTINUED | COMMUNITY
Start: 2022-08-19 | End: 2023-04-07

## 2023-04-07 RX ORDER — GLIMEPIRIDE 4 MG/1
4 TABLET ORAL
Qty: 30 | Refills: 0 | Status: DISCONTINUED | COMMUNITY
Start: 2021-11-19 | End: 2023-04-07

## 2023-04-07 RX ORDER — SITAGLIPTIN 100 MG/1
100 TABLET, FILM COATED ORAL DAILY
Qty: 90 | Refills: 0 | Status: DISCONTINUED | COMMUNITY
Start: 2022-11-23 | End: 2023-04-07

## 2023-04-07 NOTE — HISTORY OF PRESENT ILLNESS
[FreeTextEntry1] : Patient is a 67 yo man with T2DM, HTN and HLD here for diabetes\par \par Diagnosed with diabetes around 2009. Related complications include peripheral neuropathy\par Has been on several medicines including jardiance (developed urinary frequency) and ozempic (resulted in low back pain). Ozempic was restarted. Does not want metformin based on research that he has done and reports it caused erectile dysfunction\par Was taking repaglinide once and was told to stop by PCP Dr. Clinton\par Currently takes novolog 8 units AFTER lunch/dinner, Lantus 18 units at bedtime and ozempic once a week.  Recently ran out of farxiga. Glimepiride was stopped.   \par SMBG every other day: 100-180\par Reports lows to the 90s, no values < 75 \par Today he did not eat, has low symptoms and glucose is 90\par Lunch: chicken, rice and mushrooms; chicken with pasta, broccoli, vegetables; egg drop soup but he stopped it\par Dinner: cod fish, mushrooms, broccoli with brown rice\par Does not eat burgers\par He cut back on fruits and bread. Limits rice a swell.\par Stopped eating pretzels and chips \par Does not drink soda or juice\par Dilated eye exam: < 6 months, no reported complications \par \par

## 2023-04-07 NOTE — REVIEW OF SYSTEMS
[Fatigue] : no fatigue [Decreased Appetite] : appetite not decreased [Dysphagia] : no dysphagia [Dysphonia] : no dysphonia [Chest Pain] : no chest pain [Palpitations] : no palpitations [Nausea] : no nausea [Vomiting] : no vomiting [Headaches] : no headaches [Tremors] : no tremors

## 2023-04-07 NOTE — PHYSICAL EXAM
[Alert] : alert [Well Nourished] : well nourished [No Acute Distress] : no acute distress [EOMI] : extra ocular movement intact [Normal Hearing] : hearing was normal [No Respiratory Distress] : no respiratory distress [No Accessory Muscle Use] : no accessory muscle use [Clear to Auscultation] : lungs were clear to auscultation bilaterally [Normal S1, S2] : normal S1 and S2 [Normal Rate] : heart rate was normal [Normal Bowel Sounds] : normal bowel sounds [Soft] : abdomen soft [Normal Gait] : normal gait [Normal Strength/Tone] : muscle strength and tone were normal [No Motor Deficits] : the motor exam was normal [Normal Affect] : the affect was normal [Normal Insight/Judgement] : insight and judgment were intact [Normal Mood] : the mood was normal

## 2023-04-07 NOTE — ASSESSMENT
[Long Term Vascular Complications] : long term vascular complications of diabetes [Carbohydrate Consistent Diet] : carbohydrate consistent diet [Importance of Diet and Exercise] : importance of diet and exercise to improve glycemic control, achieve weight loss and improve cardiovascular health [Hypoglycemia Management] : hypoglycemia management [Action and use of Insulin] : action and use of short and long-acting insulin [Self Monitoring of Blood Glucose] : self monitoring of blood glucose [Injection Technique, Storage, Sharps Disposal] : injection technique, storage, and sharps disposal [FreeTextEntry1] : Patient is a 69 yo man with T2DM and HTN here for follow up\par \par 1. Type 2 DM\par -POCT A1c today is 7.8% and glucose is 90. He was told by his PCP one month ago that A1c was very high in the 12%.  Reports non-adherence to food and insulin at the time. He is doing much better now with  meals and insulin adherence. He ran out of farxiga\par -discussed the risks of micro/macrovascular events including, but not limited to, heart attack/stroke/eye complications/kidney disease at length\par -importance of glycemic control discussed; goal A1c of 7%\par -importance of self-monitoring of blood glucose also discussed. Goal fasting glucose 100-130 with 2 hour post-prandial goals < 180\par -dilated eye exam reportedly up to date\par -monofilament testing done\par -patient has been taking novolog after meals and he was educated that novolog should be BEFORE eating to avoid hypoglycemia.  He can continue current dose of lantus 18 daily, novolog 8 units before meals and weekly ozempic.  In an effort to titrate him off of prandial insulin, I want him to continue farxiga.  We will work on insurance authorization.  Glimepiride and januvia were stopped\par -educated that if he goes in for scheduled surgery, he should STOP the farxiga 3-5 days before surgery and can restart once he is discharged home. He will let me know when the surgery is and I will provide specific instructions for lantus at that time\par -from the endocrine standpoint, he is optimized for surgery. If he requires medical/preoperative clearance that needs to be provided by PCP\par \par 2. HTN\par -BP goal < 140/90\par

## 2023-04-28 RX ORDER — INSULIN ASPART 100 [IU]/ML
100 INJECTION, SOLUTION INTRAVENOUS; SUBCUTANEOUS
Qty: 4 | Refills: 5 | Status: DISCONTINUED | COMMUNITY
Start: 2022-12-13 | End: 2023-04-28

## 2023-07-19 ENCOUNTER — APPOINTMENT (OUTPATIENT)
Dept: ENDOCRINOLOGY | Facility: CLINIC | Age: 68
End: 2023-07-19
Payer: COMMERCIAL

## 2023-07-19 VITALS
DIASTOLIC BLOOD PRESSURE: 81 MMHG | HEIGHT: 76.5 IN | WEIGHT: 212 LBS | SYSTOLIC BLOOD PRESSURE: 109 MMHG | HEART RATE: 97 BPM | OXYGEN SATURATION: 95 % | TEMPERATURE: 97.5 F | BODY MASS INDEX: 25.55 KG/M2

## 2023-07-19 PROBLEM — E11.9 TYPE 2 DIABETES MELLITUS WITHOUT COMPLICATIONS: Chronic | Status: ACTIVE | Noted: 2020-03-09

## 2023-07-19 PROBLEM — I10 ESSENTIAL (PRIMARY) HYPERTENSION: Chronic | Status: ACTIVE | Noted: 2020-03-09

## 2023-07-19 LAB — HBA1C MFR BLD HPLC: 6.8

## 2023-07-19 PROCEDURE — 99214 OFFICE O/P EST MOD 30 MIN: CPT | Mod: 25

## 2023-07-19 PROCEDURE — 83036 HEMOGLOBIN GLYCOSYLATED A1C: CPT | Mod: QW

## 2023-07-19 RX ORDER — PEN NEEDLE, DIABETIC 32GX 5/32"
32G X 4 MM NEEDLE, DISPOSABLE MISCELLANEOUS
Qty: 1 | Refills: 0 | Status: ACTIVE | COMMUNITY
Start: 2022-07-08 | End: 1900-01-01

## 2023-07-19 NOTE — ASSESSMENT
[Long Term Vascular Complications] : long term vascular complications of diabetes [Carbohydrate Consistent Diet] : carbohydrate consistent diet [Importance of Diet and Exercise] : importance of diet and exercise to improve glycemic control, achieve weight loss and improve cardiovascular health [Self Monitoring of Blood Glucose] : self monitoring of blood glucose [Insulin Self-Administration] : insulin self-administration [Retinopathy Screening] : Patient was referred to ophthalmology for retinopathy screening [FreeTextEntry1] : Patient is a 67 yo man with T2DM and HTN here for follow up\par \par 1. Type 2 DM\par -POCT A1c today is 6.8% and he is at goal\par -discussed the risks of micro/macrovascular events including, but not limited to, heart attack/stroke/eye complications/kidney disease at length\par -importance of glycemic control discussed; goal A1c of 7%\par -importance of self-monitoring of blood glucose also discussed. Goal fasting glucose 100-130 with 2 hour post-prandial goals < 180\par -dilated eye exam reportedly up to date\par -monofilament testing done\par -novolog was discontinued as he had frequent hypoglycemia.  Continue with semglee 18 units daily, farxiga 10 mg daily and ozempic 1 mg weekly\par -educated that if he goes in for scheduled surgery, he should STOP the farxiga 3-5 days before surgery and can restart once he is discharged home. He will let me know when the surgery is and I will provide specific instructions for lantus at that time\par -from the endocrine standpoint, he is optimized for surgery. If he requires medical/preoperative clearance that needs to be provided by PCP\par \par 2. HTN\par -BP goal < 140/90\par \par 3. HLD\par -statin\par \par Follow up in 4 months\par \par

## 2023-07-19 NOTE — REVIEW OF SYSTEMS
[Fatigue] : no fatigue [Decreased Appetite] : appetite not decreased [Dysphagia] : no dysphagia [Dysphonia] : no dysphonia [Chest Pain] : no chest pain [Palpitations] : no palpitations [Shortness Of Breath] : no shortness of breath [Nausea] : no nausea

## 2023-07-19 NOTE — HISTORY OF PRESENT ILLNESS
[FreeTextEntry1] : Patient is a 69 yo man with T2DM, HTN and HLD here for diabetes\par \par Diagnosed with diabetes around 2009. Related complications include peripheral neuropathy\par Has been on several medicines including jardiance (developed urinary frequency) and ozempic (resulted in low back pain). Ozempic was restarted. Does not want metformin based on research that he has done and reports it caused erectile dysfunction\par Was taking repaglinide once and was told to stop by PCP Dr. Clinton\par Currently takes Lantus 18 units at bedtime, farxiga 10 mg daily and ozempic 1 mg weekly. The pre-meal insulin was discontinued because he kept on having lows\par SMBG every other day: 100-130\par Lowest value was 96\par Lunch: chicken, rice and mushrooms; chicken with pasta, broccoli, vegetables; egg drop soup but he stopped it\par Dinner: sandwich with swiss chesse and turkey (wheat bread)\par Does not eat burgers\par He cut back on fruits and bread. Limits rice a swell.\par Stopped eating pretzels and chips \par Does not drink soda or juice\par Dilated eye exam: < 6 months, no reported complications \par \par

## 2023-07-20 LAB
ALBUMIN SERPL ELPH-MCNC: 4.5 G/DL
ALP BLD-CCNC: 85 U/L
ALT SERPL-CCNC: 21 U/L
ANION GAP SERPL CALC-SCNC: 13 MMOL/L
AST SERPL-CCNC: 21 U/L
BILIRUB SERPL-MCNC: 0.4 MG/DL
BUN SERPL-MCNC: 16 MG/DL
CALCIUM SERPL-MCNC: 9.6 MG/DL
CHLORIDE SERPL-SCNC: 102 MMOL/L
CHOLEST SERPL-MCNC: 246 MG/DL
CO2 SERPL-SCNC: 21 MMOL/L
CREAT SERPL-MCNC: 1.03 MG/DL
CREAT SPEC-SCNC: 151 MG/DL
EGFR: 80 ML/MIN/1.73M2
ESTIMATED AVERAGE GLUCOSE: 160 MG/DL
GLUCOSE SERPL-MCNC: 166 MG/DL
HBA1C MFR BLD HPLC: 7.2 %
HDLC SERPL-MCNC: 44 MG/DL
LDLC SERPL CALC-MCNC: 139 MG/DL
MICROALBUMIN 24H UR DL<=1MG/L-MCNC: 1.2 MG/DL
MICROALBUMIN/CREAT 24H UR-RTO: NORMAL MG/G
NONHDLC SERPL-MCNC: 202 MG/DL
POTASSIUM SERPL-SCNC: 4.3 MMOL/L
PROT SERPL-MCNC: 7.3 G/DL
SODIUM SERPL-SCNC: 136 MMOL/L
TRIGL SERPL-MCNC: 348 MG/DL

## 2023-08-08 ENCOUNTER — NON-APPOINTMENT (OUTPATIENT)
Age: 68
End: 2023-08-08

## 2023-08-10 ENCOUNTER — APPOINTMENT (OUTPATIENT)
Dept: UROLOGY | Facility: CLINIC | Age: 68
End: 2023-08-10
Payer: COMMERCIAL

## 2023-08-10 VITALS — HEART RATE: 81 BPM | TEMPERATURE: 97.7 F | DIASTOLIC BLOOD PRESSURE: 77 MMHG | SYSTOLIC BLOOD PRESSURE: 113 MMHG

## 2023-08-10 PROCEDURE — 99214 OFFICE O/P EST MOD 30 MIN: CPT

## 2023-08-11 NOTE — HISTORY OF PRESENT ILLNESS
[FreeTextEntry1] : 68M here for follow up for BP   - on sildenafil 100 mg  - A1c was 12, now <7. reports urination much improved.  - formerly on tamsulosin 0.8 mg with little benefit - reports: nocturia x 4, weak stream, hesitancy. denies: leaking dribbling, dysuria, hematuria, urgency - + tobacco, + paternal prostate ca.  Last PSA: 2/2022 - 0.88

## 2023-08-11 NOTE — ASSESSMENT
[FreeTextEntry1] : 68M here for BPH  BPH - restart flomax 0.4 - UA, UCx today - PSA today - RTC 6-8 weeks  ED - 100 mg sildenafil.

## 2023-08-25 LAB
APPEARANCE: CLEAR
BACTERIA UR CULT: NORMAL
BACTERIA: NEGATIVE /HPF
BILIRUBIN URINE: NEGATIVE
BLOOD URINE: NEGATIVE
CAST: 0 /LPF
COLOR: YELLOW
EPITHELIAL CELLS: 0 /HPF
GLUCOSE QUALITATIVE U: 500 MG/DL
KETONES URINE: NEGATIVE MG/DL
LEUKOCYTE ESTERASE URINE: NEGATIVE
MICROSCOPIC-UA: NORMAL
NITRITE URINE: NEGATIVE
PH URINE: 5.5
PROTEIN URINE: NEGATIVE MG/DL
PSA FREE FLD-MCNC: 24 %
PSA FREE SERPL-MCNC: 0.23 NG/ML
PSA SERPL-MCNC: 0.97 NG/ML
RED BLOOD CELLS URINE: 0 /HPF
SPECIFIC GRAVITY URINE: 1.02
UROBILINOGEN URINE: 0.2 MG/DL
WHITE BLOOD CELLS URINE: 0 /HPF

## 2023-09-19 ENCOUNTER — NON-APPOINTMENT (OUTPATIENT)
Age: 68
End: 2023-09-19

## 2023-10-03 ENCOUNTER — APPOINTMENT (OUTPATIENT)
Dept: UROLOGY | Facility: CLINIC | Age: 68
End: 2023-10-03
Payer: COMMERCIAL

## 2023-10-03 VITALS
WEIGHT: 212 LBS | HEART RATE: 84 BPM | HEIGHT: 76.5 IN | BODY MASS INDEX: 25.55 KG/M2 | TEMPERATURE: 97.3 F | DIASTOLIC BLOOD PRESSURE: 76 MMHG | SYSTOLIC BLOOD PRESSURE: 118 MMHG

## 2023-10-03 PROCEDURE — 99214 OFFICE O/P EST MOD 30 MIN: CPT

## 2023-10-03 PROCEDURE — 51798 US URINE CAPACITY MEASURE: CPT

## 2023-10-03 RX ORDER — OXYBUTYNIN CHLORIDE 10 MG/1
10 TABLET, EXTENDED RELEASE ORAL
Qty: 30 | Refills: 11 | Status: ACTIVE | COMMUNITY
Start: 2023-10-03 | End: 1900-01-01

## 2023-10-04 LAB
APPEARANCE: CLEAR
BACTERIA: NEGATIVE /HPF
BILIRUBIN URINE: NEGATIVE
BLOOD URINE: NEGATIVE
CAST: 0 /LPF
COLOR: YELLOW
EPITHELIAL CELLS: 0 /HPF
GLUCOSE QUALITATIVE U: >=1000 MG/DL
KETONES URINE: NEGATIVE MG/DL
LEUKOCYTE ESTERASE URINE: NEGATIVE
MICROSCOPIC-UA: NORMAL
NITRITE URINE: NEGATIVE
PH URINE: 5.5
PROTEIN URINE: NEGATIVE MG/DL
RED BLOOD CELLS URINE: 0 /HPF
SPECIFIC GRAVITY URINE: >1.03
UROBILINOGEN URINE: 0.2 MG/DL
WHITE BLOOD CELLS URINE: 0 /HPF

## 2023-10-09 LAB — BACTERIA UR CULT: NORMAL

## 2023-11-09 ENCOUNTER — APPOINTMENT (OUTPATIENT)
Dept: UROLOGY | Facility: CLINIC | Age: 68
End: 2023-11-09

## 2023-11-16 ENCOUNTER — APPOINTMENT (OUTPATIENT)
Dept: ENDOCRINOLOGY | Facility: CLINIC | Age: 68
End: 2023-11-16

## 2023-12-21 ENCOUNTER — APPOINTMENT (OUTPATIENT)
Dept: ENDOCRINOLOGY | Facility: CLINIC | Age: 68
End: 2023-12-21
Payer: COMMERCIAL

## 2023-12-21 VITALS
HEART RATE: 86 BPM | WEIGHT: 222 LBS | DIASTOLIC BLOOD PRESSURE: 82 MMHG | TEMPERATURE: 98.1 F | SYSTOLIC BLOOD PRESSURE: 122 MMHG | HEIGHT: 76 IN | BODY MASS INDEX: 27.03 KG/M2 | OXYGEN SATURATION: 99 %

## 2023-12-21 DIAGNOSIS — I10 ESSENTIAL (PRIMARY) HYPERTENSION: ICD-10-CM

## 2023-12-21 DIAGNOSIS — E11.69 TYPE 2 DIABETES MELLITUS WITH OTHER SPECIFIED COMPLICATION: ICD-10-CM

## 2023-12-21 DIAGNOSIS — E78.5 TYPE 2 DIABETES MELLITUS WITH OTHER SPECIFIED COMPLICATION: ICD-10-CM

## 2023-12-21 LAB — HBA1C MFR BLD HPLC: 13.7

## 2023-12-21 PROCEDURE — 83036 HEMOGLOBIN GLYCOSYLATED A1C: CPT | Mod: QW

## 2023-12-21 PROCEDURE — 99214 OFFICE O/P EST MOD 30 MIN: CPT | Mod: 25

## 2023-12-21 RX ORDER — BLOOD-GLUCOSE METER
KIT MISCELLANEOUS
Qty: 4 | Refills: 3 | Status: ACTIVE | COMMUNITY
Start: 2023-12-21 | End: 1900-01-01

## 2023-12-21 RX ORDER — DAPAGLIFLOZIN 10 MG/1
10 TABLET, FILM COATED ORAL DAILY
Qty: 90 | Refills: 3 | Status: ACTIVE | COMMUNITY
Start: 2023-04-07 | End: 1900-01-01

## 2023-12-21 NOTE — ASSESSMENT
[Diabetes Foot Care] : diabetes foot care [Long Term Vascular Complications] : long term vascular complications of diabetes [Carbohydrate Consistent Diet] : carbohydrate consistent diet [Importance of Diet and Exercise] : importance of diet and exercise to improve glycemic control, achieve weight loss and improve cardiovascular health [Hypoglycemia Management] : hypoglycemia management [Action and use of Insulin] : action and use of short and long-acting insulin [Self Monitoring of Blood Glucose] : self monitoring of blood glucose [Insulin Self-Administration] : insulin self-administration [Injection Technique, Storage, Sharps Disposal] : injection technique, storage, and sharps disposal [FreeTextEntry1] : Patient is a 69 yo man with T2DM and HTN here for follow up  1. Type 2 DM -POCT A1c today is 13.7% and uncontrolled and the highest A1c he has had on record -nutritional feedback provided to decrease bagels and starches in the morning; add lean protein with meals, green leafy vegetables, fruits in moderation -discussed the risks of micro/macrovascular events including, but not limited to, heart attack/stroke/eye complications/kidney disease at length -importance of glycemic control discussed; goal A1c of 7% -importance of self-monitoring of blood glucose also discussed. Goal fasting glucose 100-130 with 2 hour post-prandial goals < 180 -dilated eye exam reportedly up to date -the patient was loss to endocrine follow up and reports having a lapse in insurance and significant work stress -based on the A1c, we are restarting prandial insulin aspart 8 units TID with meals, continue with semglee 18 units daily, farxiga 10 mg daily and ozempic 1 mg weekly. The patient does get GI side effects from ozempic and if he wants to discontinue it based on tolerability he is welcome to do so.  2. HTN -BP goal < 140/90  3. HLD -statin  Follow up in 3 months; call with any questions/concerns

## 2023-12-21 NOTE — REVIEW OF SYSTEMS
[Diarrhea] : diarrhea [Gas/Bloating] : gas/bloating [Stress] : stress [Fatigue] : no fatigue [Chest Pain] : no chest pain [Slow Heart Rate] : heart rate is not slow [Palpitations] : no palpitations [Fast Heart Rate] : heart rate is not fast [Nausea] : no nausea [Vomiting] : no vomiting [Headaches] : no headaches [Tremors] : no tremors [Depression] : no depression

## 2023-12-21 NOTE — HISTORY OF PRESENT ILLNESS
[FreeTextEntry1] : Patient is a 67 yo man with T2DM, HTN and HLD here for diabetes.  He has not followed up since July 2023, called office yesterday requesting an urgent visit for high glucose  Diagnosed with diabetes around 2009. Related complications include peripheral neuropathy Has been on several medicines including jardiance (developed urinary frequency) and ozempic (resulted in low back pain). Ozempic was restarted. Does not want metformin based on research that he has done and reports it caused erectile dysfunction.  States with ozempic he has diarrhea and excessive gas.  He ended up doing some research and those are all the symptoms of ozempic. Was taking repaglinide once and was told to stop by PCP Dr. Clinton Currently takes semglee 18 units at bedtime, farxiga 10 mg daily and ozempic 1 mg weekly. The pre-meal insulin was discontinued because he kept on having lows. SMBG TID: sometimes values are 150-300s.  States these glucose levels became crazy a few months ago. Denies getting any steroids.  He believes diet could have contributed (grits for breakfast and honey nut cheerios). Since seeing the high numbers he has been having salads, fruits (is careful with fruit due to sugar), chicken, stopped having fried chicken.  After the dietary changes, the numbers are slightly better. Breakfast: bagel with cream cheese and states he's in a rush and has to eat something Lunch: skips Dinner: chicken with diabetic pasta; vegetables like broccoli, peppers Reports he doesn't eat a lot of fast food but last week he had hamburger with fries Ginger ale once a month Stopped eating pretzels and chips Does not drink soda or juice Dilated eye exam: up to date, got new glasses this year

## 2023-12-21 NOTE — PHYSICAL EXAM
[Alert] : alert [No Acute Distress] : no acute distress [No Respiratory Distress] : no respiratory distress [No Accessory Muscle Use] : no accessory muscle use [Clear to Auscultation] : lungs were clear to auscultation bilaterally [Normal S1, S2] : normal S1 and S2 [Normal Rate] : heart rate was normal [Normal Bowel Sounds] : normal bowel sounds [Not Tender] : non-tender [Soft] : abdomen soft [Normal Gait] : normal gait [No Motor Deficits] : the motor exam was normal [Normal Affect] : the affect was normal [Normal Insight/Judgement] : insight and judgment were intact [Normal Mood] : the mood was normal

## 2023-12-22 RX ORDER — BLOOD SUGAR DIAGNOSTIC
STRIP MISCELLANEOUS
Qty: 3 | Refills: 3 | Status: ACTIVE | COMMUNITY
Start: 2023-12-22 | End: 1900-01-01

## 2023-12-22 RX ORDER — INSULIN GLARGINE-YFGN 100 [IU]/ML
100 INJECTION, SOLUTION SUBCUTANEOUS
Qty: 4 | Refills: 3 | Status: ACTIVE | COMMUNITY
Start: 2023-12-22 | End: 1900-01-01

## 2023-12-22 RX ORDER — INSULIN GLARGINE 100 [IU]/ML
100 INJECTION, SOLUTION SUBCUTANEOUS AT BEDTIME
Qty: 4 | Refills: 3 | Status: DISCONTINUED | COMMUNITY
Start: 2023-12-22 | End: 2023-12-22

## 2023-12-22 RX ORDER — INSULIN LISPRO 100 [IU]/ML
100 INJECTION, SOLUTION INTRAVENOUS; SUBCUTANEOUS
Qty: 4 | Refills: 3 | Status: DISCONTINUED | COMMUNITY
Start: 2023-12-22 | End: 2023-12-22

## 2023-12-22 RX ORDER — INSULIN LISPRO 100 [IU]/ML
100 INJECTION, SOLUTION INTRAVENOUS; SUBCUTANEOUS
Qty: 3 | Refills: 3 | Status: ACTIVE | COMMUNITY
Start: 2023-12-22 | End: 1900-01-01

## 2023-12-22 RX ORDER — INSULIN GLARGINE-YFGN 100 [IU]/ML
100 INJECTION, SOLUTION SUBCUTANEOUS
Qty: 4 | Refills: 3 | Status: DISCONTINUED | COMMUNITY
Start: 2023-03-31 | End: 2023-12-22

## 2023-12-22 RX ORDER — INSULIN ASPART 100 [IU]/ML
100 INJECTION, SOLUTION INTRAVENOUS; SUBCUTANEOUS
Qty: 4 | Refills: 3 | Status: DISCONTINUED | COMMUNITY
Start: 2023-04-28 | End: 2023-12-22

## 2023-12-22 RX ORDER — INSULIN GLARGINE-YFGN 100 [IU]/ML
100 INJECTION, SOLUTION SUBCUTANEOUS
Qty: 6 | Refills: 0 | Status: DISCONTINUED | COMMUNITY
Start: 2022-07-08 | End: 2023-12-22

## 2024-01-12 ENCOUNTER — APPOINTMENT (OUTPATIENT)
Dept: UROLOGY | Facility: CLINIC | Age: 69
End: 2024-01-12

## 2024-01-19 ENCOUNTER — APPOINTMENT (OUTPATIENT)
Dept: OTOLARYNGOLOGY | Facility: CLINIC | Age: 69
End: 2024-01-19
Payer: MEDICARE

## 2024-01-19 ENCOUNTER — APPOINTMENT (OUTPATIENT)
Dept: OTOLARYNGOLOGY | Facility: CLINIC | Age: 69
End: 2024-01-19

## 2024-01-19 VITALS
WEIGHT: 225 LBS | SYSTOLIC BLOOD PRESSURE: 179 MMHG | HEART RATE: 83 BPM | TEMPERATURE: 85.2 F | DIASTOLIC BLOOD PRESSURE: 116 MMHG | HEIGHT: 76 IN | BODY MASS INDEX: 27.4 KG/M2

## 2024-01-19 VITALS — SYSTOLIC BLOOD PRESSURE: 161 MMHG | DIASTOLIC BLOOD PRESSURE: 100 MMHG | HEART RATE: 84 BPM

## 2024-01-19 DIAGNOSIS — R22.1 LOCALIZED SWELLING, MASS AND LUMP, NECK: ICD-10-CM

## 2024-01-19 DIAGNOSIS — D48.7 NEOPLASM OF UNCERTAIN BEHAVIOR OF OTHER SPECIFIED SITES: ICD-10-CM

## 2024-01-19 PROCEDURE — 99214 OFFICE O/P EST MOD 30 MIN: CPT

## 2024-01-19 RX ORDER — LOSARTAN POTASSIUM 100 MG/1
TABLET, FILM COATED ORAL
Refills: 0 | Status: ACTIVE | COMMUNITY

## 2024-01-27 ENCOUNTER — OUTPATIENT (OUTPATIENT)
Dept: OUTPATIENT SERVICES | Facility: HOSPITAL | Age: 69
LOS: 1 days | End: 2024-01-27
Payer: MEDICARE

## 2024-01-27 ENCOUNTER — APPOINTMENT (OUTPATIENT)
Dept: CT IMAGING | Facility: HOSPITAL | Age: 69
End: 2024-01-27

## 2024-01-27 PROCEDURE — 70490 CT SOFT TISSUE NECK W/O DYE: CPT

## 2024-01-27 PROCEDURE — 70490 CT SOFT TISSUE NECK W/O DYE: CPT | Mod: 26

## 2024-02-06 ENCOUNTER — APPOINTMENT (OUTPATIENT)
Dept: SURGERY | Facility: AMBULATORY SURGERY CENTER | Age: 69
End: 2024-02-06

## 2024-02-16 ENCOUNTER — NON-APPOINTMENT (OUTPATIENT)
Age: 69
End: 2024-02-16

## 2024-02-16 NOTE — HISTORY OF PRESENT ILLNESS
[de-identified] : PMH: DM, HTN, HLD, BPH, peripheral neuropathy, depression, left rotator cuff tear PCP: Dr. Judith Clinton.  INITIAL VISIT 10/31/2022 Mr. EASTMAN is a 67-year-old man who was referred by Dr. Parekh for a neck mass. He noticed painless posterior neck mass for a few years and mass is increasing in size. The mass causes pressure on his neck so his neck gets tired and uncomfortable. He cannot put his head back too far due to the mass in the way. He had US of neck mass in 2020. He is to have left upper arm (7.9 cm)and right forearm lipomas removed by Dr. Parekh. He was cleared by Endocrine to have surgery. He would like to schedule removal of all masses for same OR. Smoked cigarettes 1ppd x 15 years, stopped 7-8 years ago. Quit alcohol and marijuana 25 years ago. 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  INTERVAL HISTORY 01/19/2024 VISIT Neck mass is causing pain when he is in certain positions.  He did not get CT neck ordered in 2022. He would like neck mass, c/w lipoma, removed, along with the bilateral arm lipomas by Dr. Parekh.  He reports that the neck and left arm lipomas have grown since last seen by either of us in 2022.  Dr. Parekh reportedly told him that the arm lipomas could be removed in the office.   ULTRASOUND SOFT TISSUE NECK (11-) at [facility] - COMPARISON: No old study is available. - Right submandibular gland is normal in size and homogeneous. Level 2 lymph node measures 1.6 x 0.5 x 1.2 cm. Lymph node has benign sonographic features. - Left submandibular gland is normal in size and homogeneous. Level 1 lymph node measures 1.5 x 0.4 x 1.0 cm. Level 2 lymph node measures 1.5 x 0.5 x 1.0 cm. Lymph nodes have benign sonographic features. - Posterior palpable neck mass is well-defined and heterogeneous measuring 3.3 x 1.7 x 3.5 cm. No internal vascularity. Findings are not typical for benign lipoma and correlation with CT or MRI recommended both with and without contrast. IMPRESSION: 1.) Normal bilateral cervical lymph nodes and submandibular gland. 2.) Heterogeneous well-defined solid hypovascular posterior neck mass. Although probably benign such as lipoma, further evaluation with either CT or MRI with and without contrast recommended.

## 2024-02-16 NOTE — ASSESSMENT
[FreeTextEntry1] : Mr. EASTMAN has an enlarging posterior neck lipoma that is reducing his neck range of motion. His neck pain seems musculoskeletal, maybe due to reduced ROM He has large left upper arm lipoma and a smaller right upper arm lipoma.  PLAN: CT neck to confirm lipoma and determine depth of extension (subfascial or superficial) I reviewed the surgery for removal of posterior neck lipoma, in addition to risks, benefits and alternatives (none that will treat).  Risks include, but are not limited to, bleeding, infection, scar and recurrence.  He understands that the surgical site may be depressed after surgery but then become more level over time. He understands that he may need a temporary drain to prevent fluid accumulation after lipoma is removed. His questions were answered. I will speak with Dr. Parekh about joint case OR time.

## 2024-02-16 NOTE — CONSULT LETTER
[Dear  ___] : Dear  [unfilled], [Courtesy Letter:] : I had the pleasure of seeing your patient, [unfilled], in my office today. [Please see my note below.] : Please see my note below. [Consult Closing:] : Thank you very much for allowing me to participate in the care of this patient.  If you have any questions, please do not hesitate to contact me. [Sincerely,] : Sincerely, [FreeTextEntry2] : Dr. Judith Clinton [address] [id number]    [FreeTextEntry3] :  Lori Kuo MD  Otolaryngology, Head and Neck Surgery

## 2024-02-16 NOTE — PHYSICAL EXAM
[Normal] : mucosa is normal [Midline] : trachea located in midline position [de-identified] : 6 cm firm nontender subcutaneous mass in midline at the base of the neck; moves when manipulated; c/w lipoma. [de-identified] : Left upper arm lipoma about 7 cm.  Right upper arm lipoma about 2 cm.

## 2024-03-12 ENCOUNTER — APPOINTMENT (OUTPATIENT)
Dept: UROLOGY | Facility: CLINIC | Age: 69
End: 2024-03-12
Payer: MEDICARE

## 2024-03-12 VITALS — TEMPERATURE: 97.6 F | HEART RATE: 78 BPM | DIASTOLIC BLOOD PRESSURE: 84 MMHG | SYSTOLIC BLOOD PRESSURE: 152 MMHG

## 2024-03-12 DIAGNOSIS — R35.1 BENIGN PROSTATIC HYPERPLASIA WITH LOWER URINARY TRACT SYMPMS: ICD-10-CM

## 2024-03-12 DIAGNOSIS — N40.1 BENIGN PROSTATIC HYPERPLASIA WITH LOWER URINARY TRACT SYMPMS: ICD-10-CM

## 2024-03-12 PROCEDURE — 99214 OFFICE O/P EST MOD 30 MIN: CPT

## 2024-03-12 RX ORDER — SILDENAFIL 100 MG/1
100 TABLET, FILM COATED ORAL
Qty: 30 | Refills: 10 | Status: ACTIVE | COMMUNITY
Start: 2024-03-12 | End: 1900-01-01

## 2024-03-12 RX ORDER — SILDENAFIL 100 MG/1
100 TABLET, FILM COATED ORAL
Qty: 30 | Refills: 11 | Status: DISCONTINUED | COMMUNITY
Start: 2023-08-10 | End: 2024-03-12

## 2024-03-12 NOTE — HISTORY OF PRESENT ILLNESS
[FreeTextEntry1] : KAILEE EASTMAN is a 69 year M presenting on 03/12/2024 PMH: HTN, HLD, DM2, s/p left inguinal hernia repair 3/2021, ED, BPH,  States that prior urinary frequency was due to high blood glucose. Has since improved glucose control. Stopped tamsulosin and oxybutynin. Doesn't think they were helping. Nocturia x1-2. Some bother. Will restart tamsulosin to see if effective now that sugar under better control.   ED. Sometimes able to get erections, sometimes not. Firm enough for intercourse. Will resume sildenafil.  20 pack yr smoker, quit 11 yrs ago father prostate cancer,   Labs: PSA  0.97, 8/2023 0.88, 2/2022 1.08, 2/2020  A1c: 13.7%

## 2024-03-12 NOTE — ASSESSMENT
[FreeTextEntry1] : 70yo M BPH, ED -restart tamsulosin 0.4mg. If ineffective in 6 weeks adjust by phone. -sidenafil 100mg -PSA -UA, Ucx -F/U 6mo

## 2024-03-12 NOTE — PHYSICAL EXAM
[Well Groomed] : well groomed [General Appearance - In No Acute Distress] : no acute distress [Normal Appearance] : normal appearance [Respiration, Rhythm And Depth] : normal respiratory rhythm and effort [Exaggerated Use Of Accessory Muscles For Inspiration] : no accessory muscle use [Urethral Meatus] : meatus normal [Penis Abnormality] : normal circumcised penis [Testes Tenderness] : no tenderness of the testes [Scrotum] : the scrotum was normal [Testes Mass (___cm)] : there were no testicular masses [Prostate Tenderness] : the prostate was not tender [No Prostate Nodules] : no prostate nodules [Prostate Size ___ gm] : prostate size [unfilled] gm [Normal Station and Gait] : the gait and station were normal for the patient's age [] : no rash [No Focal Deficits] : no focal deficits [Oriented To Time, Place, And Person] : oriented to person, place, and time [Mood] : the mood was normal [Affect] : the affect was normal [de-identified] : B/L 25cc testes, no prostate nodules

## 2024-03-12 NOTE — END OF VISIT
LIMB PRESERVATION SERVICE      DATE OF CONSULTATION: 6/20/2017    REASON FOR CONSULT:  Infected right lateral foot ulcer, varus deformity     HISTORY OF PRESENT ILLNESS: Patient is a 66-year-old female with a history of fibromyalgia and right lower leg paralysis, seen in the emergency room for an infected ulcer to her right lateral foot. She has a varus deformity of this foot that has progressively worsened over the past 2 years after she suffered a traumatic injury, and was on her floor unconscious for several days.  Since that time she has had numbness to her right upper thigh and is insensate from just below the knee.  She uses a wheelchair for mobility, but is able to stand for transfers and walk a few steps.  She states that when she stands, she stands on the lateral side of this foot.  She has an AFO for this foot but only wears it when she leaves the house.  She first noticed the open wound about a month ago, with purulent drainage, and soon after developed fevers, chills and nausea.   Her adult niece has been cleaning the wound with betadine and covering it with a telfa pad.  She felt she was getting worse so she came into the ED.  An xray of her foot has been done and does not show any evidence of osteomyelitis.  An MRI is pending.   WBC 9.8, ESR 27.       PAST MEDICAL HISTORY:   Past Medical History   Diagnosis Date   • Heart burn    • Pain    • Unspecified disorder of thyroid    • Kidney stones    • Arthritis    • ASTHMA    • Back pain    • Bladder infection    • Bronchitis    • Hemorrhoids    • Migraine    • Pleurisy    • Pneumonia    • Ulcer (CMS-HCC)    • Scarlet fever    • Hypertension    • Paresthesia of right foot         MEDICATIONS:   Current Facility-Administered Medications   Medication Dose   • NS (BOLUS) infusion 1,000 mL  1,000 mL   • NS infusion     • MD ALERT... vancomycin per pharmacy protocol     • cefTRIAXone (ROCEPHIN) 2 g in  mL IVPB  2 g   • potassium chloride SA (Kdur) tablet  40 mEq  40 mEq   • NS (BOLUS) infusion 1,000 mL  1,000 mL   • senna-docusate (PERICOLACE or SENOKOT S) 8.6-50 MG per tablet 2 Tab  2 Tab    And   • polyethylene glycol/lytes (MIRALAX) PACKET 1 Packet  1 Packet    And   • magnesium hydroxide (MILK OF MAGNESIA) suspension 30 mL  30 mL    And   • bisacodyl (DULCOLAX) suppository 10 mg  10 mg   • Respiratory Care per Protocol     • heparin injection 5,000 Units  5,000 Units   • labetalol (NORMODYNE,TRANDATE) injection 10 mg  10 mg   • ondansetron (ZOFRAN) syringe/vial injection 4 mg  4 mg   • ondansetron (ZOFRAN ODT) dispertab 4 mg  4 mg   • acetaminophen (TYLENOL) tablet 650 mg  650 mg   • Pharmacy Consult Request ...Pain Management Review      And   • oxycodone immediate-release (ROXICODONE) tablet 2.5 mg  2.5 mg    And   • oxycodone immediate-release (ROXICODONE) tablet 5 mg  5 mg    And   • morphine (pf) 4 mg/ml injection 2 mg  2 mg   • clonazepam (KLONOPIN) tablet 0.5 mg  0.5 mg   • pregabalin (LYRICA) capsule 75 mg  75 mg   • telmisartan (MICARDIS) tablet 40 mg  40 mg   • chlorhexidine (PERIDEX) 0.12 % solution 15 mL  15 mL     Current Outpatient Prescriptions   Medication   • Diphenhydramine-APAP, sleep, (TYLENOL PM EXTRA STRENGTH PO)   • naproxen (NAPROSYN) 250 MG Tab   • telmisartan (MICARDIS) 40 MG Tab   • pregabalin (LYRICA) 75 MG Cap   • estradiol (ESTRACE) 2 MG TABS       ALLERGIES:    Allergies   Allergen Reactions   • Penicillins Rash and Swelling     .        PAST SURGICAL HISTORY:   Past Surgical History   Procedure Laterality Date   • Tonsillectomy and adenoidectomy     • Other orthopedic surgery  2004     Right Leg ORIF   • Appendectomy     • Thyroid lobectomy  11/11/2009     Performed by PERLITA HERNANDEZ at [facility] ORS   • Hysterectomy laparoscopy       bso/ 1977       SOCIAL HISTORY:   Social History     Social History   • Marital Status:      Spouse Name: N/A   • Number of Children: N/A   • Years of Education: N/A     Social  [FreeTextEntry3] : I, Dr. Gaston, personally performed the evaluation and management (E/M) services for this established patient who presents today with (a) new problem(s)/exacerbation of (an) existing condition(s). That E/M includes conducting the clinically appropriate interval history &/or exam, assessing all new/exacerbated conditions, and establishing a new plan of care. Today, my LUZMA, [employer], was here to observe my evaluation and management service for this new problem/exacerbated condition and follow the plan of care established by me going forward "History Main Topics   • Smoking status: Never Smoker    • Smokeless tobacco: Not on file   • Alcohol Use: No   • Drug Use: No   • Sexual Activity: No     Other Topics Concern   • Not on file     Social History Narrative        FAMILY HISTORY:   Family History   Problem Relation Age of Onset   • Lung Disease Mother    • Cancer Father    • Heart Disease Father    • Lung Disease Father    • Thyroid Sister    • Cancer Brother    • Psychiatry Son        REVIEW OF SYSTEMS:   Review of Systems   Constitutional: Positive for fever, chills and malaise/fatigue. Negative for weight loss.   Respiratory: Negative for cough and shortness of breath.    Cardiovascular: Positive for leg swelling. Negative for chest pain and claudication.   Gastrointestinal: Positive for nausea. Negative for vomiting, abdominal pain, diarrhea and constipation.   Musculoskeletal: Positive for myalgias and falls.        Right foot started turning in about 2 years ago, worse over the past year  General body aches d/t fibromyalgia   Neurological:        Right upper thigh numb, lower leg insensate since trauma 2 years ago.        PHYSICAL EXAMINATION:   VITAL SIGNS: Blood pressure 164/95, pulse 107, temperature 36.9 °C (98.5 °F), resp. rate 19, height 1.676 m (5' 6\"), weight 81.647 kg (180 lb), last menstrual period 04/09/1977, SpO2 96 %.  Physical Exam   Constitutional: She is oriented to person, place, and time and well-developed, well-nourished, and in no distress.   obese   Eyes: Pupils are equal, round, and reactive to light.   Cardiovascular: Intact distal pulses.    Strong palpable pedal pulses bilaterally   Pulmonary/Chest: Effort normal.   Musculoskeletal: She exhibits edema.   Varus deformity of right foot  No gross movement of right foot /ankle  Limited ROM of right knee  Edema of right foot   Neurological: She is alert and oriented to person, place, and time.   Right lower leg and foot insensate   Skin: Skin is warm. There is erythema.   Open " wound to lateral right midfoot, 2cm diameter, 0.3cm depth. Wound bed dry, red, periwound callus  Erythema radiating 5-8 cm around wound   Psychiatric: Affect normal.         DIAGNOSTIC DATA:   Recent Labs      06/20/17   0153   WBC  9.8   RBC  5.01   HEMOGLOBIN  14.3   HEMATOCRIT  43.8   MCV  87.4   MCH  28.5   MCHC  32.6*   RDW  47.8   PLATELETCT  491*   MPV  9.0     Recent Labs      06/20/17   0153   SODIUM  136   POTASSIUM  3.6   CHLORIDE  108   CO2  14*   GLUCOSE  108*   BUN  9     IMAGING                            Results for orders placed during the hospital encounter of 06/20/17   DX-FOOT-COMPLETE 3+ RIGHT    Impression 1.  Diffuse lateral soft tissue swelling of RIGHT foot with focal ulceration over the 5th metatarsal base.  2.  No gross bony destruction, however osteomyelitis is not excluded by plain film.  3.  No fracture or dislocation.  4.  Possible metallic foreign body within the plantar soft tissues of great toe.                                                                    PROCEDURE:  Scalpel used to excise senescent red/pink tissue from wound bed, into the subcutaneous layer.  Wound cleansed with NS, wound culture collected and given to RN.  Wound covered with dry gauze.     ASSESSMENT AND PLAN:     1. Infected ulcer of right foot- Right lateral foot ulcer, complicated by paralysis and varus deformity. No evidence of osteomyelitis or abscess on x-ray.  MRI pending.  Wound culture sent.  ID to consult. Foot/ankle orthopaedic surgeon, Dr. Wu, to consult.     2. Paralysis of right lower extremity- Following trauma 2 years ago.  Patient states she has not seen a neurologist. Recommend neurology consult.

## 2024-03-21 ENCOUNTER — APPOINTMENT (OUTPATIENT)
Dept: ENDOCRINOLOGY | Facility: CLINIC | Age: 69
End: 2024-03-21
Payer: MEDICARE

## 2024-03-21 VITALS
SYSTOLIC BLOOD PRESSURE: 134 MMHG | WEIGHT: 227 LBS | BODY MASS INDEX: 27.64 KG/M2 | HEART RATE: 86 BPM | DIASTOLIC BLOOD PRESSURE: 83 MMHG | TEMPERATURE: 97.9 F | OXYGEN SATURATION: 97 % | HEIGHT: 76 IN

## 2024-03-21 DIAGNOSIS — E04.1 NONTOXIC SINGLE THYROID NODULE: ICD-10-CM

## 2024-03-21 DIAGNOSIS — E11.49 TYPE 2 DIABETES MELLITUS WITH OTHER DIABETIC NEUROLOGICAL COMPLICATION: ICD-10-CM

## 2024-03-21 LAB — HBA1C MFR BLD HPLC: 8.4

## 2024-03-21 PROCEDURE — G2211 COMPLEX E/M VISIT ADD ON: CPT

## 2024-03-21 PROCEDURE — 83036 HEMOGLOBIN GLYCOSYLATED A1C: CPT | Mod: QW

## 2024-03-21 PROCEDURE — 99214 OFFICE O/P EST MOD 30 MIN: CPT

## 2024-03-21 NOTE — HISTORY OF PRESENT ILLNESS
[FreeTextEntry1] : Patient is a 68 yo man with T2DM, HTN and HLD here for diabetes.  Diagnosed with diabetes around 2009. Related complications include peripheral neuropathy Has been on several medicines including jardiance (developed urinary frequency) and ozempic (resulted in low back pain). Ozempic was restarted. Does not want metformin based on research that he has done and reports it caused erectile dysfunction. States with ozempic he has diarrhea and excessive gas. He ended up doing some research and those are all the symptoms of ozempic. Was taking repaglinide once and was told to stop by PCP Dr. Clinton. At follow up visit December 2023, A1c was 13.7% at which point prandial insulin was recommended Currently takes semglee 18 units at bedtime, aspart 8 units TID with meals, farxiga 10 mg daily and ozempic 1 mg weekly.  SMBG TID AM: 130-180.  The other day, glucose was 200 and he had pizza evening before After eating: values are in the 180s, low of 90 Dinner: 130s Since the last visit, he decreased fruit and carbohydrates Breakfast: [employer]; he had an omelets with two pancakes Lunch: most of the time, he skips lunch Dinner: pizza He purchased an air fryer and tries to prepare meals at home five day a week Does not drink soda or juice Dilated eye exam: up to date, got new glasses this year

## 2024-03-21 NOTE — PHYSICAL EXAM
[Alert] : alert [Normal Hearing] : hearing was normal [No Acute Distress] : no acute distress [No Respiratory Distress] : no respiratory distress [No Accessory Muscle Use] : no accessory muscle use [Normal S1, S2] : normal S1 and S2 [Clear to Auscultation] : lungs were clear to auscultation bilaterally [Normal Affect] : the affect was normal [Normal Rate] : heart rate was normal [Normal Mood] : the mood was normal

## 2024-03-21 NOTE — REVIEW OF SYSTEMS
[Dysphagia] : no dysphagia [Fatigue] : no fatigue [Dysphonia] : no dysphonia [Chest Pain] : no chest pain [Shortness Of Breath] : no shortness of breath [Nausea] : no nausea [Depression] : no depression [Vomiting] : no vomiting

## 2024-03-21 NOTE — ASSESSMENT
[Long Term Vascular Complications] : long term vascular complications of diabetes [Diabetes Foot Care] : diabetes foot care [Carbohydrate Consistent Diet] : carbohydrate consistent diet [Importance of Diet and Exercise] : importance of diet and exercise to improve glycemic control, achieve weight loss and improve cardiovascular health [Hypoglycemia Management] : hypoglycemia management [Action and use of Insulin] : action and use of short and long-acting insulin [Self Monitoring of Blood Glucose] : self monitoring of blood glucose [Insulin Self-Administration] : insulin self-administration [Injection Technique, Storage, Sharps Disposal] : injection technique, storage, and sharps disposal [Diabetic Medications] : Risks and benefits of diabetic medications were discussed [FreeTextEntry1] : Patient is a 68 yo man with T2DM and HTN here for follow up  1. Type 2 DM -POCT A1c today is 8.4% and improved since last visit.  A1c is not at goal though -nutritional feedback provided to decrease bagels and starches in the morning; add lean protein with meals, green leafy vegetables, fruits in moderation. He has cut back on snacking but healthy eating remains a challenge.  Encourage to engage in mindful eating, less pizza/[employer], etc -discussed the risks of micro/macrovascular events including, but not limited to, heart attack/stroke/eye complications/kidney disease at length -importance of glycemic control discussed; goal A1c of 7% -importance of self-monitoring of blood glucose also discussed. Goal fasting glucose 100-130 with 2 hour post-prandial goals < 180 -dilated eye exam reportedly up to date -continue prandial insulin aspart 8 units TID with meals, semglee 18 units daily, farxiga 10 mg daily and ozempic 1 mg weekly. He declined titration as he felt he could work on diet. I agree.  Encourage healthier eating  2. Left thyroid nodule -patient had CT scan for posterior lipoma and there was a 3 cm incidental left thyroid nodule -he states no one told him about it -I have explained the nodules to him today -he requires diagnostic US and FNA -referred to Dr. Nassar at [address] [place] [address]  Follow up in 3 months, sooner based on thyroid findings

## 2024-03-26 ENCOUNTER — APPOINTMENT (OUTPATIENT)
Dept: ENDOCRINOLOGY | Facility: CLINIC | Age: 69
End: 2024-03-26

## 2024-04-01 ENCOUNTER — APPOINTMENT (OUTPATIENT)
Dept: ENDOCRINOLOGY | Facility: CLINIC | Age: 69
End: 2024-04-01
Payer: MEDICARE

## 2024-04-01 ENCOUNTER — RESULT REVIEW (OUTPATIENT)
Age: 69
End: 2024-04-01

## 2024-04-01 VITALS
BODY MASS INDEX: 27.39 KG/M2 | SYSTOLIC BLOOD PRESSURE: 127 MMHG | HEART RATE: 89 BPM | WEIGHT: 225 LBS | DIASTOLIC BLOOD PRESSURE: 84 MMHG

## 2024-04-01 PROCEDURE — 76536 US EXAM OF HEAD AND NECK: CPT

## 2024-04-01 PROCEDURE — 99214 OFFICE O/P EST MOD 30 MIN: CPT | Mod: 25

## 2024-04-01 PROCEDURE — 99204 OFFICE O/P NEW MOD 45 MIN: CPT | Mod: 25

## 2024-04-01 PROCEDURE — 10005 FNA BX W/US GDN 1ST LES: CPT

## 2024-04-01 NOTE — REVIEW OF SYSTEMS
[Fatigue] : no fatigue [Decreased Appetite] : appetite not decreased [Visual Field Defect] : no visual field defect [Dysphagia] : no dysphagia [Dysphonia] : no dysphonia [Chest Pain] : no chest pain [Palpitations] : no palpitations [Shortness Of Breath] : no shortness of breath [Nausea] : no nausea [Constipation] : no constipation [Vomiting] : no vomiting [Diarrhea] : no diarrhea [Polyuria] : no polyuria [Hesistancy] : no hesitancy [Joint Pain] : no joint pain [Acanthosis] : no acanthosis  [Acne] : no acne [Headaches] : no headaches [Tremors] : no tremors [Depression] : no depression [Polydipsia] : no polydipsia [Cold Intolerance] : no cold intolerance [Easy Bleeding] : no ~M tendency for easy bleeding [Easy Bruising] : no tendency for easy bruising

## 2024-04-01 NOTE — REVIEW OF SYSTEMS
[Fatigue] : no fatigue [Visual Field Defect] : no visual field defect [Decreased Appetite] : appetite not decreased [Dysphonia] : no dysphonia [Dysphagia] : no dysphagia [Chest Pain] : no chest pain [Palpitations] : no palpitations [Shortness Of Breath] : no shortness of breath [Nausea] : no nausea [Constipation] : no constipation [Vomiting] : no vomiting [Polyuria] : no polyuria [Diarrhea] : no diarrhea [Hesistancy] : no hesitancy [Joint Pain] : no joint pain [Acanthosis] : no acanthosis  [Acne] : no acne [Headaches] : no headaches [Tremors] : no tremors [Depression] : no depression [Polydipsia] : no polydipsia [Cold Intolerance] : no cold intolerance [Easy Bleeding] : no ~M tendency for easy bleeding [Easy Bruising] : no tendency for easy bruising

## 2024-04-01 NOTE — IMPRESSION
[FreeTextEntry1] : Left sided thyroid nodule 3.07 cm meeting FNA criteria  [FreeTextEntry2] : Will schedule for FNA

## 2024-04-01 NOTE — HISTORY OF PRESENT ILLNESS
[FreeTextEntry1] : 69 year old male here for evaluation of thyroid nodule (incidentally found on CT)   He denied any family history of thyroid cancer  No previous head or neck radiation exposure  Denied any compressive neck symptoms   In office US showing 3.07 x 2.28 x 2.82 cm ( Solid, hypoechoic nodule)  Last TSH in 2022 was 0.87

## 2024-04-01 NOTE — IMPRESSION
[FreeTextEntry2] : Will schedule for FNA  [FreeTextEntry1] : Left sided thyroid nodule 3.07 cm meeting FNA criteria

## 2024-04-01 NOTE — PHYSICAL EXAM
[Alert] : alert [Well Nourished] : well nourished [No Acute Distress] : no acute distress [Normal Sclera/Conjunctiva] : normal sclera/conjunctiva [PERRL] : pupils equal, round and reactive to light [Normal Outer Ear/Nose] : the ears and nose were normal in appearance [Normal TMs] : both tympanic membranes were normal [No Neck Mass] : no neck mass was observed [Thyroid Not Enlarged] : the thyroid was not enlarged [No Respiratory Distress] : no respiratory distress [Clear to Auscultation] : lungs were clear to auscultation bilaterally [Normal S1, S2] : normal S1 and S2 [Normal Rate] : heart rate was normal [Normal Bowel Sounds] : normal bowel sounds [Not Tender] : non-tender [Soft] : abdomen soft [Normal Gait] : normal gait [No Joint Swelling] : no joint swelling seen [No Rash] : no rash [No Skin Lesions] : no skin lesions [Oriented x3] : oriented to person, place, and time [Normal Insight/Judgement] : insight and judgment were intact

## 2024-04-01 NOTE — PROCEDURE
[Fine Needle Aspiration] : Fine needle aspiration ~T ~C was performed. [Area of Mass: ______] : mass identified in the [unfilled] [Risks] : risks [Patient] : the patient [Benefits] : benefits [Consent Obtained] : Written consent was obtained prior to the procedure and is detailed in the patient's record [Lidocaine Cream] : lidocaine cream [1%] : 1% [Supine] : The patient was placed in the supine position with the neck extended as tolerated. [Alcohol] : with alcohol [25 gauge 1.5 inch] : A 25 gauge 1.5 inch needle was used [2 Passes] : 2 passes were made through the mass [Ultrasonic Guidance] : ultrasound guidance was employed [Sent to Histology] : The specimens were prepared in the usual manner and sent to histology. [Tolerated Well] : the patient tolerated the procedure well [Afirma] : Afirma [Vital Signs Stable] : the vital signs were stable [No Complications] : There were no complications [[employer] e 2008 model, 10-12 MHz frequencies] : multiple real time longitudinal and transverse images were obtained using a high resolution ultrasound with a linear transducer, [employer] e 2008 model, 10-12 MHz frequencies. All measurements will be reported as longitudinal x shon-posterior x transverse. [] : a homogenous parenchyma [Isoechoic] : isoechoic nodule [Lower] : lower pole there is a  [Right Thyroid] : right [Spongiform Appearance] : spongiform appearance [Left Thyroid] : left [Mid] : mid pole there is a  [Solid] : solid [Hypoechoic] : hypoechoic nodule [Ovoid] : ovoid in shape [Smooth] : smooth [Thin] : has a thin halo [2] : 2 [Peripheral vascularity] : peripheral vascularity [No abnormal lymph nodes are seen.] : no abnormal lymph nodes are seen [FreeTextEntry1] : 3.77 x 1.31 x 2.38 [FreeTextEntry5] : 4.17 x 2.64 x 2.91 [FreeTextEntry2] : 0.22 [FreeTextEntry3] : 3.07 x 2.28 x 2.82

## 2024-04-01 NOTE — PROCEDURE
[Fine Needle Aspiration] : Fine needle aspiration ~T ~C was performed. [Area of Mass: ______] : mass identified in the [unfilled] [Risks] : risks [Patient] : the patient [Benefits] : benefits [Lidocaine Cream] : lidocaine cream [Consent Obtained] : Written consent was obtained prior to the procedure and is detailed in the patient's record [1%] : 1% [Supine] : The patient was placed in the supine position with the neck extended as tolerated. [Alcohol] : with alcohol [25 gauge 1.5 inch] : A 25 gauge 1.5 inch needle was used [2 Passes] : 2 passes were made through the mass [Ultrasonic Guidance] : ultrasound guidance was employed [Sent to Histology] : The specimens were prepared in the usual manner and sent to histology. [Tolerated Well] : the patient tolerated the procedure well [Afirma] : Afirma [Vital Signs Stable] : the vital signs were stable [No Complications] : There were no complications [[employer] e 2008 model, 10-12 MHz frequencies] : multiple real time longitudinal and transverse images were obtained using a high resolution ultrasound with a linear transducer, [employer] e 2008 model, 10-12 MHz frequencies. All measurements will be reported as longitudinal x shon-posterior x transverse. [] : a homogenous parenchyma [Isoechoic] : isoechoic nodule [Right Thyroid] : right [Lower] : lower pole there is a  [Spongiform Appearance] : spongiform appearance [Left Thyroid] : left [Mid] : mid pole there is a  [Solid] : solid [Hypoechoic] : hypoechoic nodule [Ovoid] : ovoid in shape [Smooth] : smooth [Thin] : has a thin halo [Peripheral vascularity] : peripheral vascularity [2] : 2 [No abnormal lymph nodes are seen.] : no abnormal lymph nodes are seen [FreeTextEntry1] : 3.77 x 1.31 x 2.38 [FreeTextEntry2] : 0.22 [FreeTextEntry5] : 4.17 x 2.64 x 2.91 [FreeTextEntry3] : 3.07 x 2.28 x 2.82

## 2024-04-01 NOTE — PHYSICAL EXAM
[Alert] : alert [Well Nourished] : well nourished [Normal Sclera/Conjunctiva] : normal sclera/conjunctiva [No Acute Distress] : no acute distress [PERRL] : pupils equal, round and reactive to light [Normal Outer Ear/Nose] : the ears and nose were normal in appearance [Normal TMs] : both tympanic membranes were normal [No Neck Mass] : no neck mass was observed [Thyroid Not Enlarged] : the thyroid was not enlarged [No Respiratory Distress] : no respiratory distress [Clear to Auscultation] : lungs were clear to auscultation bilaterally [Normal S1, S2] : normal S1 and S2 [Normal Rate] : heart rate was normal [Normal Bowel Sounds] : normal bowel sounds [Not Tender] : non-tender [Soft] : abdomen soft [Normal Gait] : normal gait [No Joint Swelling] : no joint swelling seen [No Rash] : no rash [No Skin Lesions] : no skin lesions [Oriented x3] : oriented to person, place, and time [Normal Insight/Judgement] : insight and judgment were intact

## 2024-04-01 NOTE — ASSESSMENT
[FreeTextEntry1] : 69 year old male here for evaluation of large dominant 3.0 cm left sided thyroid nodule.  No high risk history or compressive neck symptoms.  Will proceed with FNA of the dominant left sided nodule  -Will be called back with results -Saved for affirma  -Follow up as per results

## 2024-04-01 NOTE — PROCEDURE
[Fine Needle Aspiration] : Fine needle aspiration ~T ~C was performed. [Area of Mass: ______] : mass identified in the [unfilled] [Risks] : risks [Patient] : the patient [Benefits] : benefits [Consent Obtained] : Written consent was obtained prior to the procedure and is detailed in the patient's record [Lidocaine Cream] : lidocaine cream [1%] : 1% [Supine] : The patient was placed in the supine position with the neck extended as tolerated. [Alcohol] : with alcohol [25 gauge 1.5 inch] : A 25 gauge 1.5 inch needle was used [2 Passes] : 2 passes were made through the mass [Ultrasonic Guidance] : ultrasound guidance was employed [Sent to Histology] : The specimens were prepared in the usual manner and sent to histology. [Tolerated Well] : the patient tolerated the procedure well [Afirma] : Afirma [Vital Signs Stable] : the vital signs were stable [No Complications] : There were no complications [[employer] e 2008 model, 10-12 MHz frequencies] : multiple real time longitudinal and transverse images were obtained using a high resolution ultrasound with a linear transducer, [employer] e 2008 model, 10-12 MHz frequencies. All measurements will be reported as longitudinal x shon-posterior x transverse. [] : a homogenous parenchyma [Isoechoic] : isoechoic nodule [Right Thyroid] : right [Lower] : lower pole there is a  [Spongiform Appearance] : spongiform appearance [Left Thyroid] : left [Solid] : solid [Mid] : mid pole there is a  [Hypoechoic] : hypoechoic nodule [Ovoid] : ovoid in shape [Smooth] : smooth [Thin] : has a thin halo [Peripheral vascularity] : peripheral vascularity [2] : 2 [No abnormal lymph nodes are seen.] : no abnormal lymph nodes are seen [FreeTextEntry1] : 3.77 x 1.31 x 2.38 [FreeTextEntry2] : 0.22 [FreeTextEntry5] : 4.17 x 2.64 x 2.91 [FreeTextEntry3] : 3.07 x 2.28 x 2.82

## 2024-04-16 ENCOUNTER — NON-APPOINTMENT (OUTPATIENT)
Age: 69
End: 2024-04-16

## 2024-04-23 RX ORDER — SEMAGLUTIDE 1.34 MG/ML
4 INJECTION, SOLUTION SUBCUTANEOUS
Qty: 12 | Refills: 3 | Status: ACTIVE | COMMUNITY
Start: 2022-04-25 | End: 1900-01-01

## 2024-04-26 ENCOUNTER — APPOINTMENT (OUTPATIENT)
Dept: ORTHOPEDIC SURGERY | Facility: CLINIC | Age: 69
End: 2024-04-26
Payer: MEDICARE

## 2024-04-26 VITALS
WEIGHT: 225 LBS | OXYGEN SATURATION: 96 % | HEIGHT: 76 IN | BODY MASS INDEX: 27.4 KG/M2 | DIASTOLIC BLOOD PRESSURE: 83 MMHG | SYSTOLIC BLOOD PRESSURE: 133 MMHG | HEART RATE: 80 BPM

## 2024-04-26 DIAGNOSIS — Z47.1 AFTERCARE FOLLOWING JOINT REPLACEMENT SURGERY: ICD-10-CM

## 2024-04-26 DIAGNOSIS — Z96.642 AFTERCARE FOLLOWING JOINT REPLACEMENT SURGERY: ICD-10-CM

## 2024-04-26 DIAGNOSIS — M17.0 BILATERAL PRIMARY OSTEOARTHRITIS OF KNEE: ICD-10-CM

## 2024-04-26 DIAGNOSIS — M19.90 UNSPECIFIED OSTEOARTHRITIS, UNSPECIFIED SITE: ICD-10-CM

## 2024-04-26 PROCEDURE — 99204 OFFICE O/P NEW MOD 45 MIN: CPT

## 2024-04-26 RX ORDER — MELOXICAM 7.5 MG/1
7.5 TABLET ORAL DAILY
Qty: 30 | Refills: 2 | Status: ACTIVE | COMMUNITY
Start: 2024-04-26 | End: 1900-01-01

## 2024-04-26 RX ORDER — HYALURONATE SODIUM 20 MG/2 ML
20 SYRINGE (ML) INTRAARTICULAR
Qty: 3 | Refills: 0 | Status: ACTIVE | OUTPATIENT
Start: 2024-04-26

## 2024-04-30 NOTE — DISCUSSION/SUMMARY
[de-identified] : Imp: 69 year old male now 12 years s/p L ROSY with question of iliopsoas tendinopathy vs aseptic loosening, as well as right much worse than left knee osteoarthritis. - We will continue with diagnostic testing today for the left hip. -- In order to clearly delineate component loosening or not, will order left hip bone scan. -- In order to screen for infection, will order serum inflammatory markers today. - For the moment, this could also be a case of severe iliopsoas tendinopathy, for which we will refer to outpatient PT and gave instructions for HEP, along with Tylenol + meloxicam use as needed. - Iliopsoas sheet and right knee CSIs cannot be administered at this time due to poorly controlled diabetes, as evidenced by A1c above 8. I discussed with the patient the importance of improvement in A1c, which he is currently working on. - However, we can pursue HA injections, for which we will send for authorization. - I will follow up with patient by phone once above testing is completed. Our office will contact the patient once we have authorization to proceed with right knee HA injections. - I did encourage him to retrieve his operative report and postoperative XRs from [facility], which we will review at next visit.

## 2024-04-30 NOTE — HISTORY OF PRESENT ILLNESS
[10] : a current pain level of 10/10 [Bending] : worsened by bending [Rest] : relieved by rest [de-identified] : 04/26/2024: 59 year old presenting for initial evaluation of left hip and right knee pain. He underwent a left hip arthroplasty in 2012 at [facility]. Left hip pain onset one year ago, with no known specific injury, though notes pain began to worsen after using the leg extension machine at his gym. He localizes pain along the anterior groin pain, rated at 10/10, with no radiation down the leg or distal thigh pain. He has had no PT or treatment for the left hip. He rates his right knee pain as 9/10, localized along the medial and lateral jointline, worse with bending. For the right knee, he has had no PT, injections, or medications. He denies walking distance limitation. Pain primarily starts on the first few movements in the morning with initial limping gait, improves with walking distance. He also reports he has peripheral neuropathy in the feet, taking gabapentin 300 mg daily for this issue.  PMH significant for diabetes (last A1c 8.2), HTN History of hernia repair (2023), right knee arthroscopy > 10 years ago He participated in [employer] for over 20 years He worked as a city employee, now retired [de-identified] : BURNING

## 2024-04-30 NOTE — PHYSICAL EXAM
[de-identified] : General appearance: well nourished and hydrated, pleasant, alert and oriented x 3, cooperative.   HEENT: normocephalic, EOM intact, wearing mask, external auditory canal clear.   Cardiovascular: no lower leg edema, no varicosities, dorsalis pedis pulses palpable and symmetric.   Lymphatics: no palpable lymphadenopathy, no lymphedema.   Neurologic: sensation is normal, no muscle weakness in upper or lower extremities, patella tendon reflexes present and symmetric.   Dermatologic: skin moist, warm, no rash.   Spine: cervical spine with normal lordosis and painless range of motion, thoracic spine with normal kyphosis and painless range of motion, lumbosacral spine with normal lordosis and painless range of motion.  No tenderness to palpation along midline spine and paraspinal musculature.  Sacroiliac joints nontender bilaterally. Negative SLR and crossed SLR tests bilaterally. Gait: normal.    Right knee: - Focal soft tissue swelling: none - Ecchymosis: none - Erythema: none - Effusion: boggy popliteal swelling, no identifiable Baker's cyst - Wounds: well-healed arthroscopic portals, benign-appearing  - Alignment: varus - Tenderness: mild pain and mild crepitus with patellar compression test - ROM: 7-95 - Collateral laxity: none - Cruciate laxity: none - Popliteal angle (degrees): 40 - Quad strength: 5/5  Limb lengths: clinically LLE appears to be 3-5 mm longer than right - Of note, he does have notable asymmetry with right knee flexion contracture and varus alignment  Left hip: - Focal soft tissue swelling: none - Ecchymosis: none - Erythema: none - Wounds: none - Tenderness: none - ROM:    - Flexion: 110   - Extension: 0   - Adduction: 15   - Abduction: 45   - Internal rotation in 90 degrees of hip flexion: 5   - External rotation in 90 degrees of hip flexion: 30 - JOHNY: negative - FADIR elicits lateral trochanteric pain - Vito: negative - Stinchfield: negative - Flexor power: 4+/5 - Abductor power: 5/5 [de-identified] : XRs of the bilateral hips and the bilateral knees were interpreted by me and reviewed with the patient.  Location of imaging: [facility] Date of exam: 10/17/23  Pelvic alignment: slight obliquity with left side up  Right hip --  Arthritis: mild-moderate osteoarthritis with predominantly superior joint space narrowing, TONNIS 1 Deformity: Cam Osteonecrosis: none  Left hip demonstrates a total hip arthroplasty in position, most likely a Depuy - All components appear to be in reasonable position - Can appreciate some radiolucencies, both surrounding the acetabular component and the proximal 2/3rds of the femoral component - The femoral component does appear to be in mild varus alignment - No fractures are identified  Right knee --  Alignment: varus Arthritis: tricompartmental osteoarthritis, most pronounced in the medial and patellofemoral compartments with bone-on-bone articulation, Kellgren & Lucio 4 Patellar height: normal Patellar tracking: central  Left knee --  Alignment: normal Arthritis: mild tricompartmental osteoarthritis, most pronounced in the patellofemoral compartment, Kellgren & Lucio 1 Patellar height: normal Patellar tracking: central

## 2024-04-30 NOTE — END OF VISIT
[FreeTextEntry3] : All medical record entries made by the Scribe were at my, Dr. Eulalio Desai, direction and personally dictated by me on 04/26/2024. I have reviewed the chart and agree that the record accurately reflects my personal performance of the history, physical exam, assessment and plan. I have also personally directed, reviewed, and agreed with the chart.

## 2024-04-30 NOTE — ADDENDUM
[FreeTextEntry1] : I, Bhupinder Choi, documented this note as a scribe on behalf of Dr. Eulalio Desai on 04/26/2024.

## 2024-05-02 ENCOUNTER — NON-APPOINTMENT (OUTPATIENT)
Age: 69
End: 2024-05-02

## 2024-05-02 LAB
CRP SERPL-MCNC: <3 MG/L
ERYTHROCYTE [SEDIMENTATION RATE] IN BLOOD BY WESTERGREN METHOD: 19 MM/HR
HCT VFR BLD CALC: 43.1 %
HGB BLD-MCNC: 12.5 G/DL
MCHC RBC-ENTMCNC: 22 PG
MCHC RBC-ENTMCNC: 29 GM/DL
MCV RBC AUTO: 75.9 FL
PLATELET # BLD AUTO: 234 K/UL
RBC # BLD: 5.68 M/UL
RBC # FLD: 17.1 %
WBC # FLD AUTO: 4.66 K/UL

## 2024-05-04 NOTE — END OF VISIT
[FreeTextEntry3] : I have seen and evaluated the patient and formulated a plan of care after the NP/resident or fellow saw the patient. I have edited the note above to reflect my recommendations and agree with the documentation and plan as set forth.\par 
Previously Declined (within the last year)

## 2024-05-10 ENCOUNTER — APPOINTMENT (OUTPATIENT)
Dept: ORTHOPEDIC SURGERY | Facility: CLINIC | Age: 69
End: 2024-05-10
Payer: MEDICARE

## 2024-05-10 DIAGNOSIS — M79.641 PAIN IN RIGHT HAND: ICD-10-CM

## 2024-05-10 DIAGNOSIS — M65.341 TRIGGER FINGER, RIGHT RING FINGER: ICD-10-CM

## 2024-05-10 DIAGNOSIS — M19.041 PRIMARY OSTEOARTHRITIS, RIGHT HAND: ICD-10-CM

## 2024-05-10 DIAGNOSIS — M20.091 OTHER DEFORMITY OF RIGHT FINGER(S): ICD-10-CM

## 2024-05-10 PROCEDURE — 20550 NJX 1 TENDON SHEATH/LIGAMENT: CPT | Mod: 59,F8

## 2024-05-10 PROCEDURE — 99213 OFFICE O/P EST LOW 20 MIN: CPT | Mod: 25

## 2024-05-10 NOTE — ASSESSMENT
[FreeTextEntry1] : 70 y/o male with history of trigger finger starting 8 years ago that was better for a while but now has been ongoing again for over 6 months.  There is some stiffness and contracture of the ring finger PIP joint.  Today's steroid injection was done for the trigger finger to quiet down inflammation and pain and then he was referred to hand therapy to work on range of motion. Ibuprofen as needed or Voltaren gel. If he has ongoing symptoms I would recommend seeing hand specialist who may try another injection or surgery may be considered.  I gave him the hand specialist.  If it does get better he can follow-up as needed

## 2024-05-10 NOTE — HISTORY OF PRESENT ILLNESS
[de-identified] :  Mr. Tapia is a 69 year old man who comes in for evaluation for RIGHT 4th trigger finger. He has had this for 8+ years. He had seen someone 8 years ago and had a steroid injection but he has continued to have this over the years.  He has pain grasping things. When it occurs pain can 10/10. 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

## 2024-05-10 NOTE — PROCEDURE
[de-identified] : Injection for trigger finger right fourth flexor tendon sheath at the level of the A1 pulley.  Risks and benefits discussed. The skin was cleaned with ChloraPrep and alcohol.  Ethyl chloride was sprayed for anesthesia. Using a 25-gauge needle under sterile conditions 1.5 cc of dexamethasone 4 mg and 0.5 cc of 1% lidocaine were injected into the flexor tendon sheath around the level of the A1 pulley.  Band-Aid was applied.  He tolerated the procedure well.

## 2024-05-10 NOTE — PHYSICAL EXAM
[UE] : Sensory: Intact in bilateral upper extremities [Normal RUE] : Right Upper Extremity: No scars, rashes, lesions, ulcers, skin intact [Normal LUE] : Left Upper Extremity: No scars, rashes, lesions, ulcers, skin intact [Normal Touch] : sensation intact for touch [Normal] : Oriented to person, place, and time, insight and judgement were intact and the affect was normal [de-identified] : RIGHT hand There is some enlargement DIP joints consistent with some arthritis.  Also thumb CMC arthrosis is present with minimal tenderness at the thumb CMC joint. Tender at the A1 pulley at the ring finger flexor tendon sheath. There is loss of full extension of the fourth finger PIP joint and also loss of flexion. Triggering could be elicited with pain and catching. Sensation and motor intact otherwise. No edema, ecchymoses, erythema.  Skin is intact.  Fingers are warm with normal capillary refill [de-identified] :  X-rays ordered, performed and reviewed today of RIGHT hand AP and lateral views show arthritis of the ring finger DIP joint with osteophyte.

## 2024-06-03 ENCOUNTER — APPOINTMENT (OUTPATIENT)
Dept: ENDOCRINOLOGY | Facility: CLINIC | Age: 69
End: 2024-06-03

## 2024-06-19 ENCOUNTER — RX CHANGE (OUTPATIENT)
Age: 69
End: 2024-06-19

## 2024-06-19 RX ORDER — TAMSULOSIN HYDROCHLORIDE 0.4 MG/1
0.4 CAPSULE ORAL
Qty: 30 | Refills: 3 | Status: DISCONTINUED | COMMUNITY
Start: 2023-08-10 | End: 2024-06-19

## 2024-06-19 RX ORDER — TAMSULOSIN HYDROCHLORIDE 0.4 MG/1
0.4 CAPSULE ORAL
Qty: 90 | Refills: 2 | Status: ACTIVE | COMMUNITY
Start: 1900-01-01 | End: 1900-01-01

## 2024-07-12 ENCOUNTER — NON-APPOINTMENT (OUTPATIENT)
Age: 69
End: 2024-07-12

## 2024-07-19 ENCOUNTER — APPOINTMENT (OUTPATIENT)
Dept: ENDOCRINOLOGY | Facility: CLINIC | Age: 69
End: 2024-07-19

## 2024-07-21 ENCOUNTER — NON-APPOINTMENT (OUTPATIENT)
Age: 69
End: 2024-07-21

## 2024-07-22 ENCOUNTER — APPOINTMENT (OUTPATIENT)
Dept: ENDOCRINOLOGY | Facility: CLINIC | Age: 69
End: 2024-07-22
Payer: MEDICARE

## 2024-07-22 VITALS
BODY MASS INDEX: 26.79 KG/M2 | SYSTOLIC BLOOD PRESSURE: 126 MMHG | TEMPERATURE: 98 F | HEIGHT: 76 IN | WEIGHT: 220 LBS | HEART RATE: 86 BPM | DIASTOLIC BLOOD PRESSURE: 71 MMHG | OXYGEN SATURATION: 97 %

## 2024-07-22 DIAGNOSIS — E11.49 TYPE 2 DIABETES MELLITUS WITH OTHER DIABETIC NEUROLOGICAL COMPLICATION: ICD-10-CM

## 2024-07-22 DIAGNOSIS — E04.1 NONTOXIC SINGLE THYROID NODULE: ICD-10-CM

## 2024-07-22 LAB — HBA1C MFR BLD HPLC: 6.6

## 2024-07-22 PROCEDURE — 99215 OFFICE O/P EST HI 40 MIN: CPT

## 2024-07-22 PROCEDURE — 83036 HEMOGLOBIN GLYCOSYLATED A1C: CPT | Mod: QW

## 2024-07-22 PROCEDURE — G2211 COMPLEX E/M VISIT ADD ON: CPT

## 2024-07-22 NOTE — REVIEW OF SYSTEMS
[Fatigue] : no fatigue [Dysphagia] : no dysphagia [Dysphonia] : no dysphonia [Chest Pain] : no chest pain [Palpitations] : no palpitations [Shortness Of Breath] : no shortness of breath [Nausea] : no nausea [Vomiting] : no vomiting [Depression] : no depression

## 2024-07-22 NOTE — PHYSICAL EXAM
[Alert] : alert [No Respiratory Distress] : no respiratory distress [No Accessory Muscle Use] : no accessory muscle use [Clear to Auscultation] : lungs were clear to auscultation bilaterally [Normal S1, S2] : normal S1 and S2 [Normal Rate] : heart rate was normal [Normal Bowel Sounds] : normal bowel sounds [Not Tender] : non-tender [Soft] : abdomen soft [Normal Gait] : normal gait [Normal Affect] : the affect was normal [Normal Insight/Judgement] : insight and judgment were intact [Normal Mood] : the mood was normal [Normal Hearing] : hearing was normal

## 2024-07-22 NOTE — HISTORY OF PRESENT ILLNESS
[FreeTextEntry1] : Patient is a 68 yo man with T2DM and left thyroid nodule here for follow up  Diagnosed with diabetes around 2009. Related complications include peripheral neuropathy Has been on several medicines including jardiance (developed urinary frequency) and ozempic (resulted in low back pain). Ozempic was restarted. Does not want metformin based on research that he has done and reports it caused erectile dysfunction. States with ozempic he has diarrhea and excessive gas. He ended up doing some research and those are all the symptoms of ozempic. Was taking repaglinide once and was told to stop by PCP Dr. Clinton. At follow up visit December 2023, A1c was 13.7% at which point prandial insulin was recommended Currently takes semglee 18 units at bedtime, aspart 8 units TID with meals, farxiga 10 mg daily and ozempic 1 mg weekly.  March 2024, A1c was 8.4% and he declined adjustments to medicines in favor of lifestyle changes SMBG TID States the numbers are "pretty good."  His PCP told him A1c went down to "7 point something." Dr. Clinton is his PCP After eating: values are in the 180s, low of 90 Dinner: 130s Diet: peanuts, salads, fruits and oatmeal. "From what I read and documentaries that I watched, plant based diet would help my diet." Does not drink soda or juice Dilated eye exam: up to date, got new glasses this year  Left thyroid nodule: incidental finding when he had a CT scan for posterior lipoma.  There was a 3 cm left thyroid nodule that was biopsied by Dr. Nassar April 2024. Results were FLUS [place] Category 3 with suspicious Affirma. He was scheduled for repeat FNA with Dr. Nassar but cancelled the appointment.  Patient ended up repeating the biopsy Dr. Aditya Montes "I think that's his name."  He had the biopsy July 2, 2024. States he got it done at [facility] No family history of thyroid cancer No previous head or neck radiation No compressive symptoms

## 2024-07-22 NOTE — ASSESSMENT
[Long Term Vascular Complications] : long term vascular complications of diabetes [Carbohydrate Consistent Diet] : carbohydrate consistent diet [Importance of Diet and Exercise] : importance of diet and exercise to improve glycemic control, achieve weight loss and improve cardiovascular health [Hypoglycemia Management] : hypoglycemia management [Self Monitoring of Blood Glucose] : self monitoring of blood glucose [Retinopathy Screening] : Patient was referred to ophthalmology for retinopathy screening [Diabetic Medications] : Risks and benefits of diabetic medications were discussed [FreeTextEntry1] : Patient is a 70 yo man with type 2 diabetes and thyroid nodule here for follow up  1. Type 2 DM -POCT A1c today is 6.6% and at goal -nutritional feedback provided and he has made improvements to his diet.  Having more vegetables and low sugar fruits, complex carbohydrates  -discussed the risks of micro/macrovascular events including, but not limited to, heart attack/stroke/eye complications/kidney disease at length -importance of glycemic control discussed; goal A1c of 7% -importance of self-monitoring of blood glucose also discussed. Goal fasting glucose 100-130 with 2 hour post-prandial goals < 180 -dilated eye exam reportedly up to date -decrease prandial insulin aspart 6 units TID with meals.  Continue semglee 18 units daily, farxiga 10 mg daily and ozempic 1 mg weekly. He was educated about holding farxiga for 5 days before any potential surgery/anesthesia and ozempic 1 weeek prior -hypoglycemia education provided.  Goal is to titrate off of prandial insulin first and iff possible basal insulin as long as glucose/A1c levels are controlled  2. Left thyroid nodule -patient had CT scan for posterior lipoma and there was a 3 cm incidental left thyroid nodule -he had diagnostic US and FNA. FLUS with suspicious Affirma. He had repeat biopsy at [facility] on July 2, 2024 but no results. He is going to go home and confirm results.  He said they weren't ready yet. -i explained to him in common language that the thyroid biopsy had borderline results with genetic testing that was suspicious for thyroid cancer without mutation.  In these cases, we have two options 1. repeat biopsy with Thyroseq and 2. send for surgical consultation. He preferred to repeat the biopsy.  Because I did not know where he went to get it done, I'm not certain reflex THYROSEQ was ordered. Once those results are in, I will be better able to assess next steps in management/surveillance  Follow up in 3 months, sooner based on thyroid findings.

## 2024-08-01 ENCOUNTER — NON-APPOINTMENT (OUTPATIENT)
Age: 69
End: 2024-08-01

## 2024-08-02 ENCOUNTER — APPOINTMENT (OUTPATIENT)
Dept: NEUROLOGY | Facility: CLINIC | Age: 69
End: 2024-08-02
Payer: MEDICARE

## 2024-08-02 ENCOUNTER — NON-APPOINTMENT (OUTPATIENT)
Age: 69
End: 2024-08-02

## 2024-08-02 VITALS
TEMPERATURE: 97.8 F | DIASTOLIC BLOOD PRESSURE: 71 MMHG | OXYGEN SATURATION: 97 % | SYSTOLIC BLOOD PRESSURE: 105 MMHG | WEIGHT: 221 LBS | HEART RATE: 90 BPM | HEIGHT: 76 IN | BODY MASS INDEX: 26.91 KG/M2

## 2024-08-02 VITALS — SYSTOLIC BLOOD PRESSURE: 97 MMHG | DIASTOLIC BLOOD PRESSURE: 64 MMHG

## 2024-08-02 DIAGNOSIS — G62.9 POLYNEUROPATHY, UNSPECIFIED: ICD-10-CM

## 2024-08-02 PROCEDURE — 99204 OFFICE O/P NEW MOD 45 MIN: CPT

## 2024-08-02 RX ORDER — DULOXETINE HYDROCHLORIDE 30 MG/1
30 CAPSULE, DELAYED RELEASE PELLETS ORAL
Qty: 30 | Refills: 0 | Status: ACTIVE | COMMUNITY
Start: 2024-08-02 | End: 1900-01-01

## 2024-08-02 RX ORDER — MELATONIN 2.5MG/10ML
600 LIQUID (ML) ORAL
Qty: 90 | Refills: 3 | Status: ACTIVE | COMMUNITY
Start: 2024-08-02 | End: 1900-01-01

## 2024-08-02 NOTE — HISTORY OF PRESENT ILLNESS
[FreeTextEntry1] : Referred by Dr. Eulalio Desai for peripheral neuropathy Symptoms started a few years ago, complains of numbness, tingling, burning sensations in the feet, worse at rest. Symptoms have been getting progressively worse. Glucose has been up and down over the years, 13% in 12/2023 down to 6.6% last month, he believes due to diet (no changes in meds)  He takes gabapentin which initially helped but no longer, takes up to 1500mg at night   Reviewed: Labs below Notes from endocrinology, orthopedics

## 2024-08-02 NOTE — ASSESSMENT
[FreeTextEntry1] : Diabetic peripheral neuropathy  High dose of gabapentin without significant relief Add cymbalta 30mg daily, increase to 60mg if tolerated Restart alpha lipoic acid 600mg daily Counseled on continued glucose control to avoid worsening neuropathy f/u with Dr. Dominguez in 3-4 months

## 2024-08-02 NOTE — PHYSICAL EXAM
[FreeTextEntry1] : Motor: 5/5 strength throughout Sensory: vibration mod-sev red at toes and mod at ankles, normal / minimally reduced at knees; JPS intact at toes b/l Reflexes: triceps and patellar 1+, biceps, BR and achilles absent b/l  Gait: normal

## 2024-08-09 ENCOUNTER — APPOINTMENT (OUTPATIENT)
Dept: UROLOGY | Facility: CLINIC | Age: 69
End: 2024-08-09

## 2024-08-09 PROBLEM — N52.9 ERECTILE DYSFUNCTION OF ORGANIC ORIGIN: Status: ACTIVE | Noted: 2024-08-09

## 2024-08-09 PROCEDURE — G2211 COMPLEX E/M VISIT ADD ON: CPT

## 2024-08-09 PROCEDURE — 99214 OFFICE O/P EST MOD 30 MIN: CPT

## 2024-08-12 NOTE — HISTORY OF PRESENT ILLNESS
[FreeTextEntry1] : 69M here for follow up for BPH and ED  - at prior appt had improved glycemic control, found improved urinary symptoms  - trialed tamsulosin for further relief  - reports he tried x 3 months, stopped x 1 week and there has been no significant difference  - state nocturia 2-3x, slow flow - Erectile dysfunction failed sildenafil 100 mg  - not interested in ICI   -Denies: hesitancy n/v/d dysuria hematuria flank pain fever chills   Last PSA: 0.97 8/2023

## 2024-08-12 NOTE — END OF VISIT
[FreeTextEntry3] : I, Dr. Gaston, personally performed the evaluation and management (E/M) services for this established patient who presents today with (a) new problem(s)/exacerbation of (an) existing condition(s). That E/M includes conducting the clinically appropriate interval history &/or exam, assessing all new/exacerbated conditions, and establishing a new plan of care. Today, my LUZMA, Garfield Mcclain, was here to observe my evaluation and management service for this new problem/exacerbated condition and follow the plan of care established by me going forward

## 2024-08-12 NOTE — ASSESSMENT
[FreeTextEntry1] : 69M here for follow up for BPH and ED  BPH  - alfuzosin 10 mg  - UA UCx PSA   ED  - trial Cialis 20 mg  will call with update if fails will start oxybutynin by phone and dc alfuzosin f/u 6 weeks consider outlet surgery

## 2024-09-10 ENCOUNTER — APPOINTMENT (OUTPATIENT)
Dept: OTOLARYNGOLOGY | Facility: CLINIC | Age: 69
End: 2024-09-10
Payer: MEDICARE

## 2024-09-10 VITALS
TEMPERATURE: 96.5 F | DIASTOLIC BLOOD PRESSURE: 85 MMHG | HEART RATE: 77 BPM | HEIGHT: 75 IN | WEIGHT: 225 LBS | BODY MASS INDEX: 27.98 KG/M2 | SYSTOLIC BLOOD PRESSURE: 120 MMHG

## 2024-09-10 DIAGNOSIS — E04.1 NONTOXIC SINGLE THYROID NODULE: ICD-10-CM

## 2024-09-10 DIAGNOSIS — D48.7 NEOPLASM OF UNCERTAIN BEHAVIOR OF OTHER SPECIFIED SITES: ICD-10-CM

## 2024-09-10 PROCEDURE — 99214 OFFICE O/P EST MOD 30 MIN: CPT

## 2024-09-10 RX ORDER — ASPIRIN 81 MG
81 TABLET, DELAYED RELEASE (ENTERIC COATED) ORAL
Refills: 0 | Status: ACTIVE | COMMUNITY

## 2024-09-10 RX ORDER — EPLERENONE 25 MG/1
25 TABLET, COATED ORAL
Refills: 0 | Status: ACTIVE | COMMUNITY

## 2024-09-10 NOTE — HISTORY OF PRESENT ILLNESS
[de-identified] : Mr. EASTMAN reports that the posterior neck and bilateral UE lipomas still bother him and are a little larger over past few months.  Excision of the lipomas by myself and Dr. Parekh had been cancelled earlier this year due to uncontrolled HTN. He said that his BP is controlled, and he is cleared to proceed with surgery. He had US FNA of left 3 cm thyroid nodule by Dr. Nassar in April 2024 --> FLUS with suspicious Afirma. Repeat US FNA of same nodule at [facility] in July 2024 and read at [facility] --> Suspicious for Follicular Neoplasm; ThyroSeq negative (approximately 3%) probability of cancer or NIFTP).   CT NECK SOFT TISSUE (01/27/2024) at [facility] - COMPARISON: None available. * There is a well-circumscribed fat density mass which is an elliptically shaped and measures 2.4 x 4.5 x 3.2 cm (AP x TRV x CC) (axial series 3, image 60) (sagittal series 6, image 39). It is located within the right paramedian posterior neck subcutaneous soft tissues and spans the C5-C7 levels. The mass lies superficial to the paraspinal musculature. * There is streak and beam hardening artifact generated by dental amalgam. This limits evaluation of the surrounding structures, particularly the oral cavity. The remainder of the aerodigestive tract has an unremarkable noncontrast appearance. * There is no cervical lymphadenopathy by strict measurement size criteria. * The salivary glands have an unremarkable noncontrast appearance. * There is a 3.0 x 2.5 cm left-sided thyroid nodule occupying the lower pole. The right thyroid lobe and thyroid isthmus appear unremarkable. Mild shift of the trachea is seen to the right side. The trachea remains patent. * Evaluation of the neck vessels is limited secondary to exclusion of IV contrast. * The imaged intracranial and orbital contents appear unremarkable. * The paranasal sinuses and tympanomastoid cavities are clear. * The maxilla, mandible, and zygomatic arches are intact. The TMJ spaces appear within normal limits. * Mild multilevel cervical spondylosis is seen. * The imaged portions of the lungs are clear. IMPRESSION: 1. Neck lipoma, as discussed. 2. 3.0 x 2.5 cm left-sided thyroid nodule. Thyroid lobe opacity cannot be excluded. Recommend ultrasound correlation. 3. No cervical lymphadenopathy.

## 2024-09-10 NOTE — ASSESSMENT
[FreeTextEntry1] : Mr. EASTMAN has slowly growing posterior neck lipoma that is reducing his neck range of motion. CT neck shows that the lipoma is subcutaneous and does not involve the paraspinous musculature.   He has large left upper arm lipoma and a smaller right lower arm lipoma. He would like to have all three lipomas removed, which had been scheduled and then cancelled due to uncontrolled hypertension. Now that his BP is controlled, he would like to r/s surgery. I reviewed the surgery for removal of posterior neck lipoma, in addition to risks, benefits and alternatives (none that will treat).  Risks include, but are not limited to, bleeding, infection, scar and recurrence.  He understands that the neck surgical site may be depressed after surgery but then become more level over time. He understands that he may need a temporary drain to prevent fluid accumulation after lipoma is removed. His questions were answered. My surgical coordinator with confer with Dr. Parekh' staff to select OR dates for the joint case.  incidental left thyroid nodule, 3 cm, first seen on the CT neck - had indeterminate FNA x 2, with suspicious Afirma on first biopsy, then negative ThyroSeq on second FNA. - followed by [facility] for DM and this nodule

## 2024-09-10 NOTE — CONSULT LETTER
[Dear  ___] : Dear  [unfilled], [Courtesy Letter:] : I had the pleasure of seeing your patient, [unfilled], in my office today. [Please see my note below.] : Please see my note below. [Consult Closing:] : Thank you very much for allowing me to participate in the care of this patient.  If you have any questions, please do not hesitate to contact me. [Sincerely,] : Sincerely, [FreeTextEntry2] : Dr. Judith Clinton [address] [phone]  [FreeTextEntry3] :  Lori Kuo MD  Otolaryngology, Head and Neck Surgery     [DrPurvi  ___] : Dr. HUBER

## 2024-09-10 NOTE — PHYSICAL EXAM
[de-identified] : 5cm doughy nontender subcutaneous mass in midline at the base of the neck; moves when manipulated; c/w lipoma. [de-identified] : Barely palpable left thyroid nodule. [Normal] : no neck adenopathy [de-identified] : Left upper arm lipoma about 7 cm.  Right lower arm lipoma about 3 cm.

## 2024-10-23 RX ORDER — ACETAMINOPHEN 325 MG
1000 TABLET ORAL ONCE
Refills: 0 | Status: DISCONTINUED | OUTPATIENT
Start: 2024-10-24 | End: 2024-10-24

## 2024-10-23 RX ORDER — APREPITANT 125MG-80MG
40 KIT ORAL ONCE
Refills: 0 | Status: DISCONTINUED | OUTPATIENT
Start: 2024-10-24 | End: 2024-10-24

## 2024-10-23 NOTE — ASU PATIENT PROFILE, ADULT - NS PREOP UNDERSTANDS INFO
No solid food/dairy/candy/gum after midnight tonight; water allowed before 05:00am tomorrow; patient reminded to come with photo ID/insurance/credit card; dress in comfortable clothes; no jewelries/contact lens/valuable; no smoking/alcohol drinking/recreational drug use today; escort to have photo ID; address and callback number was given/yes

## 2024-10-23 NOTE — ASU PATIENT PROFILE, ADULT - NSICDXPASTSURGICALHX_GEN_ALL_CORE_FT
PAST SURGICAL HISTORY:  History of hip replacement, total left    S/P left inguinal hernia repair

## 2024-10-24 ENCOUNTER — TRANSCRIPTION ENCOUNTER (OUTPATIENT)
Age: 69
End: 2024-10-24

## 2024-10-24 ENCOUNTER — APPOINTMENT (OUTPATIENT)
Dept: OTOLARYNGOLOGY | Facility: HOSPITAL | Age: 69
End: 2024-10-24

## 2024-10-24 ENCOUNTER — OUTPATIENT (OUTPATIENT)
Dept: OUTPATIENT SERVICES | Facility: HOSPITAL | Age: 69
LOS: 1 days | Discharge: HOME | End: 2024-10-24
Payer: MEDICARE

## 2024-10-24 ENCOUNTER — RESULT REVIEW (OUTPATIENT)
Age: 69
End: 2024-10-24

## 2024-10-24 ENCOUNTER — NON-APPOINTMENT (OUTPATIENT)
Age: 69
End: 2024-10-24

## 2024-10-24 ENCOUNTER — APPOINTMENT (OUTPATIENT)
Dept: SURGERY | Facility: AMBULATORY SURGERY CENTER | Age: 69
End: 2024-10-24

## 2024-10-24 VITALS
HEIGHT: 75 IN | OXYGEN SATURATION: 96 % | HEART RATE: 67 BPM | DIASTOLIC BLOOD PRESSURE: 83 MMHG | TEMPERATURE: 97 F | RESPIRATION RATE: 15 BRPM | WEIGHT: 218.7 LBS | SYSTOLIC BLOOD PRESSURE: 131 MMHG

## 2024-10-24 VITALS
TEMPERATURE: 97 F | HEART RATE: 72 BPM | DIASTOLIC BLOOD PRESSURE: 76 MMHG | OXYGEN SATURATION: 98 % | SYSTOLIC BLOOD PRESSURE: 114 MMHG | RESPIRATION RATE: 18 BRPM

## 2024-10-24 DIAGNOSIS — Z98.890 OTHER SPECIFIED POSTPROCEDURAL STATES: Chronic | ICD-10-CM

## 2024-10-24 DIAGNOSIS — Z96.649 PRESENCE OF UNSPECIFIED ARTIFICIAL HIP JOINT: Chronic | ICD-10-CM

## 2024-10-24 LAB
GLUCOSE BLDC GLUCOMTR-MCNC: 113 MG/DL — HIGH (ref 70–99)
GLUCOSE BLDC GLUCOMTR-MCNC: 122 MG/DL — HIGH (ref 70–99)

## 2024-10-24 PROCEDURE — 21552 EXC NECK LES SC 3 CM/>: CPT | Mod: GC

## 2024-10-24 PROCEDURE — 24071 EXC ARM/ELBOW LES SC 3 CM/>: CPT | Mod: RT,GC

## 2024-10-24 PROCEDURE — 24073 EX ARM/ELBOW TUM DEEP 5 CM/>: CPT | Mod: LT,GC

## 2024-10-24 PROCEDURE — 88304 TISSUE EXAM BY PATHOLOGIST: CPT | Mod: 26

## 2024-10-24 DEVICE — SURGIFOAM PAD 8CM X 12.5CM X 10MM (100): Type: IMPLANTABLE DEVICE | Status: FUNCTIONAL

## 2024-10-24 RX ORDER — METOPROLOL TARTRATE 50 MG
1 TABLET ORAL
Refills: 0 | DISCHARGE

## 2024-10-24 RX ORDER — GABAPENTIN 800 MG/1
1 TABLET, FILM COATED ORAL
Refills: 0 | DISCHARGE

## 2024-10-24 RX ORDER — SEMAGLUTIDE 1.34 MG/ML
2 INJECTION, SOLUTION SUBCUTANEOUS
Refills: 0 | DISCHARGE

## 2024-10-24 RX ORDER — DAPAGLIFLOZIN 10 MG/1
1 TABLET, FILM COATED ORAL
Refills: 0 | DISCHARGE

## 2024-10-24 RX ORDER — BENZOCAINE AND LEVOMENTHOL 200; 5 MG/G; MG/G
1 SPRAY TOPICAL ONCE
Refills: 0 | Status: COMPLETED | OUTPATIENT
Start: 2024-10-24 | End: 2024-10-24

## 2024-10-24 RX ORDER — INSULIN GLARGINE 300 U/ML
20 INJECTION, SOLUTION SUBCUTANEOUS
Refills: 0 | DISCHARGE

## 2024-10-24 RX ORDER — LOSARTAN POTASSIUM 100 MG/1
1 TABLET, FILM COATED ORAL
Refills: 0 | DISCHARGE

## 2024-10-24 RX ORDER — SODIUM CHLORIDE IRRIG SOLUTION 0.9 %
500 SOLUTION, IRRIGATION IRRIGATION ONCE
Refills: 0 | Status: COMPLETED | OUTPATIENT
Start: 2024-10-24 | End: 2024-10-24

## 2024-10-24 RX ORDER — OXYCODONE HYDROCHLORIDE 30 MG/1
1 TABLET, FILM COATED, EXTENDED RELEASE ORAL
Qty: 20 | Refills: 0
Start: 2024-10-24

## 2024-10-24 RX ORDER — ATORVASTATIN CALCIUM 10 MG/1
1 TABLET, FILM COATED ORAL
Refills: 0 | DISCHARGE

## 2024-10-24 RX ORDER — CEPHALEXIN 500 MG
1 CAPSULE ORAL
Qty: 14 | Refills: 0
Start: 2024-10-24 | End: 2024-10-30

## 2024-10-24 RX ORDER — ASPIRIN 325 MG
1 TABLET ORAL
Refills: 0 | DISCHARGE

## 2024-10-24 RX ORDER — ALFUZOSIN HYDROCHLORIDE 10 MG/1
1 TABLET ORAL
Refills: 0 | DISCHARGE

## 2024-10-24 RX ORDER — FENTANYL CITRATE-0.9 % NACL/PF 300MCG/30
25 PATIENT CONTROLLED ANALGESIA VIAL INJECTION
Refills: 0 | Status: DISCONTINUED | OUTPATIENT
Start: 2024-10-24 | End: 2024-10-24

## 2024-10-24 RX ORDER — LIDOCAINE 50 MG/G
15 CREAM TOPICAL ONCE
Refills: 0 | Status: DISCONTINUED | OUTPATIENT
Start: 2024-10-24 | End: 2024-10-24

## 2024-10-24 RX ORDER — EPLERENONE 50 MG
1 TABLET ORAL
Refills: 0 | DISCHARGE

## 2024-10-24 RX ORDER — SODIUM CHLORIDE IRRIG SOLUTION 0.9 %
500 SOLUTION, IRRIGATION IRRIGATION
Refills: 0 | Status: DISCONTINUED | OUTPATIENT
Start: 2024-10-24 | End: 2024-10-24

## 2024-10-24 RX ORDER — ONDANSETRON HCL/PF 4 MG/2 ML
4 VIAL (ML) INJECTION ONCE
Refills: 0 | Status: DISCONTINUED | OUTPATIENT
Start: 2024-10-24 | End: 2024-10-24

## 2024-10-24 RX ADMIN — Medication 100 MILLILITER(S): at 13:27

## 2024-10-24 RX ADMIN — Medication 1000 MILLILITER(S): at 14:13

## 2024-10-24 RX ADMIN — BENZOCAINE AND LEVOMENTHOL 1 LOZENGE: 200; 5 SPRAY TOPICAL at 13:24

## 2024-10-24 NOTE — ASU DISCHARGE PLAN (ADULT/PEDIATRIC) - PROVIDER TOKENS
PROVIDER:[TOKEN:[[id number]]] PROVIDER:[TOKEN:[[id number]]],PROVIDER:[TOKEN:[[id number]]]

## 2024-10-24 NOTE — BRIEF OPERATIVE NOTE - NSICDXBRIEFPROCEDURE_GEN_ALL_CORE_FT
PROCEDURES:  Excision, neoplasm, soft tissue, subfascial, neck, less than 5 cm in diameter 24-Oct-2024 12:15:29  Soila Jain   PROCEDURES:  Excision, neoplasm, soft tissue, subcutaneous, neck, 3 cm or greater in diameter 24-Oct-2024 15:11:09  Lori Kuo

## 2024-10-24 NOTE — ASU DISCHARGE PLAN (ADULT/PEDIATRIC) - CARE PROVIDER_API CALL
Lori Kuo  Otolaryngology  [address]  [place] [address]  Phone: [phone]  Fax: [phone]  Follow Up Time:    Lori Kuo  Otolaryngology  [address], [address]  [place] [address]  Phone: [phone]  Fax: [phone]  Follow Up Time:     Pedro Parekh  Surgery  [address], [address]  [place] [address]  Phone: [phone]  Fax: [phone]  Follow Up Time:

## 2024-10-24 NOTE — BRIEF OPERATIVE NOTE - NSICDXBRIEFPREOP_GEN_ALL_CORE_FT
PRE-OP DIAGNOSIS:  Lipoma of arm 24-Oct-2024 10:39:53  Uriah Ortez  Lipoma of upper arm 24-Oct-2024 10:39:28  Uriah Ortez  
PRE-OP DIAGNOSIS:  Lipoma of neck 24-Oct-2024 12:15:43  Soila Jain

## 2024-10-24 NOTE — PRE-ANESTHESIA EVALUATION ADULT - NSANTHOSAYNRD_GEN_A_CORE
No. RANDOLPH screening performed.  STOP BANG Legend: 0-2 = LOW Risk; 3-4 = INTERMEDIATE Risk; 5-8 = HIGH Risk

## 2024-10-24 NOTE — ASU DISCHARGE PLAN (ADULT/PEDIATRIC) - FINANCIAL ASSISTANCE
[facility] provides services at a reduced cost to those who are determined to be eligible through [facility]’s financial assistance program. Information regarding [facility]’s financial assistance program can be found by going to [email or web address] or by calling [phone].

## 2024-10-24 NOTE — BRIEF OPERATIVE NOTE - NSICDXBRIEFPROCEDURE_GEN_ALL_CORE_FT
PROCEDURES:  Excision, neoplasm, soft tissue, subcutaneous, upper arm, 3 cm or greater in diameter 24-Oct-2024 10:37:11  Uriah Ortez  Excision, neoplasm, soft tissue, subcutaneous, upper arm, less than 3 cm in diameter 24-Oct-2024 10:38:56  Uriah Ortez

## 2024-10-24 NOTE — BRIEF OPERATIVE NOTE - OPERATION/FINDINGS
excision of posterior neck lipoma superficial to trapezius fascia  excision of posterior neck encapsulated lipoma, 4.5 x 3 cm, superficial to trapezius fascia

## 2024-10-24 NOTE — BRIEF OPERATIVE NOTE - NSICDXBRIEFPOSTOP_GEN_ALL_CORE_FT
POST-OP DIAGNOSIS:  Lipoma of neck 24-Oct-2024 12:15:53  Soila Jain  
POST-OP DIAGNOSIS:  Lipoma of upper arm 24-Oct-2024 10:40:17  Uriah Ortez  Lipoma of arm 24-Oct-2024 10:40:11  Uriah Ortez

## 2024-10-24 NOTE — ASU DISCHARGE PLAN (ADULT/PEDIATRIC) - NS MD DC FALL RISK RISK
For information on Fall & Injury Prevention, visit: [email or web address] OR  [email or web address] OR  https://www.cdc.gov/steadi/patient.html

## 2024-10-24 NOTE — BRIEF OPERATIVE NOTE - OPERATION/FINDINGS
See operative report for full detail. Left upper arm lipoma measuring approximately 7cm in max diameter. Curvilinear incisions made longitudinally overlying lipoma. Lipoma dissected off from dermal and fascial attachments. Removed en bloc. Hemostasis confirmed. Excision of oval skin overlying. 2-0 vicryl for deep dermal closure, then 4-0 running monocryl subcutaneous    3-4cm R lower arm lipoma. Singleincision overlying lipoma. Dissected off attachments. Removed en bloc.Same closure per above. See operative report for full detail. Left upper arm lipoma measuring approximately 7cm in max diameter. Curvilinear incisions made longitudinally overlying lipoma. Lipoma dissected off from dermal and fascial attachments. Removed en bloc. Hemostasis confirmed. Excision of oval skin overlying. 2-0 vicryl for deep dermal closure, then 4-0 running monocryl subcutaneous    3-4cm R lower arm lipoma. Single incision overlying subcutaneous lipoma. Dissected off attachments. Removed en bloc. Same closure per above. 2-0 vicryl for deep dermal closure, then 4-0 running monocryl for subcutaneous. Dermabond at end.    Remainder of procedure with Dr. Kuo for neck lipoma excision

## 2024-10-24 NOTE — ASU DISCHARGE PLAN (ADULT/PEDIATRIC) - ASU DC SPECIAL INSTRUCTIONSFT
You may shower in 48hours. Pat dry neck. Leave the white steri strips in place, they will fall off on their own in approximately 7-10 days.     Please call office to confirm f/u appointment with Dr. Kuo on You may shower in 48hours. Pat dry neck. Leave the white steri strips in place, they will fall off on their own in approximately 7-10 days.     Please call office to confirm f/u appointment with Dr. Kuo.    For pain you may take Tylenol 650mg every 6 hours as needed. Do not exceed 4g/24hrs. You may also take 400mg Ibuprofen every 8 hours as needed for pain. Take your arm bandage and neck bandage off in 24 hours. You may shower. Pat dry neck. Leave the white steri strips in place, they will fall off on their own in approximately 7-10 days.     Please call office to confirm f/u appointment with Dr. Kuo in two weeks.     Take your antibiotic as prescribed.    For pain you may take Tylenol 650mg every 6 hours as needed. Do not exceed 4g/24hrs. You may also take 400mg Ibuprofen every 8 hours as needed for pain.

## 2024-10-28 PROBLEM — Z87.438 PERSONAL HISTORY OF OTHER DISEASES OF MALE GENITAL ORGANS: Chronic | Status: ACTIVE | Noted: 2024-10-23

## 2024-10-28 PROBLEM — I25.10 ATHEROSCLEROTIC HEART DISEASE OF NATIVE CORONARY ARTERY WITHOUT ANGINA PECTORIS: Chronic | Status: ACTIVE | Noted: 2024-10-23

## 2024-10-28 PROBLEM — K21.9 GASTRO-ESOPHAGEAL REFLUX DISEASE WITHOUT ESOPHAGITIS: Chronic | Status: ACTIVE | Noted: 2024-10-23

## 2024-10-28 PROBLEM — E78.5 HYPERLIPIDEMIA, UNSPECIFIED: Chronic | Status: ACTIVE | Noted: 2024-10-23

## 2024-10-28 PROBLEM — Z86.69 PERSONAL HISTORY OF OTHER DISEASES OF THE NERVOUS SYSTEM AND SENSE ORGANS: Chronic | Status: ACTIVE | Noted: 2024-10-24

## 2024-10-31 LAB — SURGICAL PATHOLOGY STUDY: SIGNIFICANT CHANGE UP

## 2024-11-07 ENCOUNTER — APPOINTMENT (OUTPATIENT)
Dept: OTOLARYNGOLOGY | Facility: CLINIC | Age: 69
End: 2024-11-07
Payer: MEDICARE

## 2024-11-07 DIAGNOSIS — Z86.03 PERSONAL HISTORY OF NEOPLASM OF UNCERTAIN BEHAVIOR: ICD-10-CM

## 2024-11-07 DIAGNOSIS — D17.0 BENIGN LIPOMATOUS NEOPLASM OF SKIN AND SUBCUTANEOUS TISSUE OF HEAD, FACE AND NECK: ICD-10-CM

## 2024-11-07 DIAGNOSIS — R22.1 LOCALIZED SWELLING, MASS AND LUMP, NECK: ICD-10-CM

## 2024-11-07 PROCEDURE — 99024 POSTOP FOLLOW-UP VISIT: CPT

## 2024-11-08 ENCOUNTER — APPOINTMENT (OUTPATIENT)
Dept: SURGERY | Facility: CLINIC | Age: 69
End: 2024-11-08
Payer: MEDICARE

## 2024-11-08 VITALS
OXYGEN SATURATION: 98 % | HEIGHT: 75.5 IN | TEMPERATURE: 97.2 F | WEIGHT: 219 LBS | HEART RATE: 93 BPM | DIASTOLIC BLOOD PRESSURE: 73 MMHG | SYSTOLIC BLOOD PRESSURE: 117 MMHG | BODY MASS INDEX: 26.95 KG/M2

## 2024-11-08 DIAGNOSIS — R22.32 LOCALIZED SWELLING, MASS AND LUMP, LEFT UPPER LIMB: ICD-10-CM

## 2024-11-08 PROCEDURE — 99024 POSTOP FOLLOW-UP VISIT: CPT

## 2024-11-10 RX ORDER — CEPHALEXIN 500 MG/1
500 TABLET ORAL
Qty: 14 | Refills: 0 | Status: COMPLETED | COMMUNITY
Start: 2024-10-24

## 2024-11-10 RX ORDER — DOXYCYCLINE HYCLATE 100 MG/1
100 CAPSULE ORAL
Qty: 14 | Refills: 0 | Status: COMPLETED | COMMUNITY
Start: 2024-11-02

## 2024-11-10 RX ORDER — OXYCODONE 5 MG/1
5 TABLET ORAL
Qty: 20 | Refills: 0 | Status: COMPLETED | COMMUNITY
Start: 2024-10-24

## 2024-11-10 RX ORDER — FOLIC ACID 1 MG/1
1 TABLET ORAL
Qty: 90 | Refills: 0 | Status: ACTIVE | COMMUNITY
Start: 2024-10-31

## 2024-11-22 ENCOUNTER — APPOINTMENT (OUTPATIENT)
Dept: SURGERY | Facility: CLINIC | Age: 69
End: 2024-11-22
Payer: MEDICARE

## 2024-11-22 VITALS
HEART RATE: 82 BPM | BODY MASS INDEX: 27.19 KG/M2 | TEMPERATURE: 97.1 F | SYSTOLIC BLOOD PRESSURE: 116 MMHG | WEIGHT: 221 LBS | HEIGHT: 75.5 IN | OXYGEN SATURATION: 98 % | DIASTOLIC BLOOD PRESSURE: 74 MMHG

## 2024-11-22 DIAGNOSIS — R22.32 LOCALIZED SWELLING, MASS AND LUMP, LEFT UPPER LIMB: ICD-10-CM

## 2024-11-22 DIAGNOSIS — R22.31 LOCALIZED SWELLING, MASS AND LUMP, RIGHT UPPER LIMB: ICD-10-CM

## 2024-11-22 PROCEDURE — 99024 POSTOP FOLLOW-UP VISIT: CPT

## 2024-12-20 ENCOUNTER — APPOINTMENT (OUTPATIENT)
Dept: NEUROLOGY | Age: 69
End: 2024-12-20

## 2025-02-07 ENCOUNTER — APPOINTMENT (OUTPATIENT)
Dept: UROLOGY | Facility: CLINIC | Age: 70
End: 2025-02-07

## 2025-02-21 ENCOUNTER — NON-APPOINTMENT (OUTPATIENT)
Age: 70
End: 2025-02-21

## 2025-02-21 ENCOUNTER — APPOINTMENT (OUTPATIENT)
Dept: UROLOGY | Facility: CLINIC | Age: 70
End: 2025-02-21
Payer: MEDICARE

## 2025-02-21 VITALS
TEMPERATURE: 96.8 F | HEIGHT: 75.5 IN | WEIGHT: 221 LBS | BODY MASS INDEX: 27.19 KG/M2 | HEART RATE: 90 BPM | DIASTOLIC BLOOD PRESSURE: 75 MMHG | SYSTOLIC BLOOD PRESSURE: 143 MMHG

## 2025-02-21 DIAGNOSIS — N40.1 BENIGN PROSTATIC HYPERPLASIA WITH LOWER URINARY TRACT SYMPMS: ICD-10-CM

## 2025-02-21 DIAGNOSIS — R35.1 BENIGN PROSTATIC HYPERPLASIA WITH LOWER URINARY TRACT SYMPMS: ICD-10-CM

## 2025-02-21 PROCEDURE — 51798 US URINE CAPACITY MEASURE: CPT

## 2025-02-21 PROCEDURE — G2211 COMPLEX E/M VISIT ADD ON: CPT

## 2025-02-21 PROCEDURE — 99214 OFFICE O/P EST MOD 30 MIN: CPT

## 2025-02-21 RX ORDER — FINASTERIDE 5 MG/1
5 TABLET, FILM COATED ORAL DAILY
Qty: 90 | Refills: 3 | Status: ACTIVE | COMMUNITY
Start: 2025-02-21 | End: 1900-01-01

## 2025-02-24 LAB
APPEARANCE: CLEAR
BACTERIA UR CULT: NORMAL
BACTERIA: NEGATIVE /HPF
BILIRUBIN URINE: NEGATIVE
BLOOD URINE: NEGATIVE
CAST: 0 /LPF
COLOR: YELLOW
EPITHELIAL CELLS: 0 /HPF
GLUCOSE QUALITATIVE U: >=1000 MG/DL
KETONES URINE: NEGATIVE MG/DL
LEUKOCYTE ESTERASE URINE: NEGATIVE
MICROSCOPIC-UA: NORMAL
NITRITE URINE: NEGATIVE
PH URINE: 6
PROTEIN URINE: NEGATIVE MG/DL
RED BLOOD CELLS URINE: 0 /HPF
SPECIFIC GRAVITY URINE: >1.03
UROBILINOGEN URINE: 1 MG/DL
WHITE BLOOD CELLS URINE: 0 /HPF

## 2025-02-25 ENCOUNTER — APPOINTMENT (OUTPATIENT)
Dept: ENDOCRINOLOGY | Facility: CLINIC | Age: 70
End: 2025-02-25
Payer: MEDICARE

## 2025-02-25 VITALS
SYSTOLIC BLOOD PRESSURE: 124 MMHG | HEART RATE: 90 BPM | BODY MASS INDEX: 27.12 KG/M2 | HEIGHT: 76.5 IN | DIASTOLIC BLOOD PRESSURE: 81 MMHG | WEIGHT: 225 LBS

## 2025-02-25 DIAGNOSIS — E04.1 NONTOXIC SINGLE THYROID NODULE: ICD-10-CM

## 2025-02-25 DIAGNOSIS — E11.65 TYPE 2 DIABETES MELLITUS WITH HYPERGLYCEMIA: ICD-10-CM

## 2025-02-25 PROCEDURE — 36415 COLL VENOUS BLD VENIPUNCTURE: CPT | Mod: 59

## 2025-02-25 PROCEDURE — 83036 HEMOGLOBIN GLYCOSYLATED A1C: CPT | Mod: QW

## 2025-02-25 PROCEDURE — G2211 COMPLEX E/M VISIT ADD ON: CPT

## 2025-02-25 PROCEDURE — 99215 OFFICE O/P EST HI 40 MIN: CPT

## 2025-02-25 PROCEDURE — 82962 GLUCOSE BLOOD TEST: CPT

## 2025-02-25 RX ORDER — INSULIN GLARGINE 100 [IU]/ML
100 INJECTION, SOLUTION SUBCUTANEOUS
Refills: 0 | Status: ACTIVE | COMMUNITY

## 2025-02-25 RX ORDER — REPAGLINIDE 1 MG/1
1 TABLET ORAL
Qty: 90 | Refills: 1 | Status: ACTIVE | COMMUNITY
Start: 2025-02-25 | End: 1900-01-01

## 2025-03-01 LAB
ALBUMIN SERPL ELPH-MCNC: 4.7 G/DL
ALP BLD-CCNC: 88 U/L
ALT SERPL-CCNC: 32 U/L
ANION GAP SERPL CALC-SCNC: 12 MMOL/L
AST SERPL-CCNC: 26 U/L
BILIRUB SERPL-MCNC: 0.4 MG/DL
BUN SERPL-MCNC: 20 MG/DL
C PEPTIDE SERPL-MCNC: 1.8 NG/ML
CALCIUM SERPL-MCNC: 9.9 MG/DL
CHLORIDE SERPL-SCNC: 104 MMOL/L
CHOLEST SERPL-MCNC: 152 MG/DL
CO2 SERPL-SCNC: 24 MMOL/L
CREAT SERPL-MCNC: 0.97 MG/DL
CREAT SPEC-SCNC: 209 MG/DL
EGFR: 85 ML/MIN/1.73M2
ESTIMATED AVERAGE GLUCOSE: 206 MG/DL
GLUCOSE BLDC GLUCOMTR-MCNC: 210
GLUCOSE SERPL-MCNC: 201 MG/DL
HBA1C MFR BLD HPLC: 8.3
HBA1C MFR BLD HPLC: 8.8 %
HCT VFR BLD CALC: 45.3 %
HDLC SERPL-MCNC: 52 MG/DL
HGB BLD-MCNC: 13.5 G/DL
LDLC SERPL CALC-MCNC: 86 MG/DL
MCHC RBC-ENTMCNC: 23.2 PG
MCHC RBC-ENTMCNC: 29.8 G/DL
MCV RBC AUTO: 77.8 FL
MICROALBUMIN 24H UR DL<=1MG/L-MCNC: 1.7 MG/DL
MICROALBUMIN/CREAT 24H UR-RTO: 8 MG/G
NONHDLC SERPL-MCNC: 100 MG/DL
PLATELET # BLD AUTO: 210 K/UL
POTASSIUM SERPL-SCNC: 4.7 MMOL/L
PROT SERPL-MCNC: 7.4 G/DL
RBC # BLD: 5.82 M/UL
RBC # FLD: 16.2 %
SODIUM SERPL-SCNC: 139 MMOL/L
TRIGL SERPL-MCNC: 69 MG/DL
TSH SERPL-ACNC: 0.61 UIU/ML
WBC # FLD AUTO: 5.4 K/UL

## 2025-03-25 ENCOUNTER — APPOINTMENT (OUTPATIENT)
Dept: ENDOCRINOLOGY | Facility: CLINIC | Age: 70
End: 2025-03-25

## 2025-05-05 ENCOUNTER — RX RENEWAL (OUTPATIENT)
Age: 70
End: 2025-05-05

## 2025-05-23 ENCOUNTER — APPOINTMENT (OUTPATIENT)
Dept: UROLOGY | Facility: CLINIC | Age: 70
End: 2025-05-23

## 2025-05-26 ENCOUNTER — RX RENEWAL (OUTPATIENT)
Age: 70
End: 2025-05-26

## 2025-06-24 ENCOUNTER — APPOINTMENT (OUTPATIENT)
Dept: UROLOGY | Facility: CLINIC | Age: 70
End: 2025-06-24
Payer: MEDICARE

## 2025-06-24 VITALS
SYSTOLIC BLOOD PRESSURE: 135 MMHG | DIASTOLIC BLOOD PRESSURE: 79 MMHG | HEART RATE: 76 BPM | BODY MASS INDEX: 27.12 KG/M2 | TEMPERATURE: 98 F | WEIGHT: 225 LBS | HEIGHT: 76.5 IN

## 2025-06-24 PROCEDURE — 99214 OFFICE O/P EST MOD 30 MIN: CPT

## 2025-06-24 PROCEDURE — 51798 US URINE CAPACITY MEASURE: CPT

## 2025-06-24 PROCEDURE — G2211 COMPLEX E/M VISIT ADD ON: CPT

## 2025-06-26 LAB
APPEARANCE: CLEAR
BACTERIA UR CULT: NORMAL
BACTERIA: NEGATIVE /HPF
BILIRUBIN URINE: NEGATIVE
BLOOD URINE: NEGATIVE
CAST: 0 /LPF
COLOR: YELLOW
EPITHELIAL CELLS: 0 /HPF
GLUCOSE QUALITATIVE U: >=1000 MG/DL
KETONES URINE: ABNORMAL MG/DL
LEUKOCYTE ESTERASE URINE: NEGATIVE
MICROSCOPIC-UA: NORMAL
NITRITE URINE: NEGATIVE
PH URINE: 6
PROTEIN URINE: NORMAL MG/DL
RED BLOOD CELLS URINE: 0 /HPF
SPECIFIC GRAVITY URINE: >1.03
UROBILINOGEN URINE: 1 MG/DL
WHITE BLOOD CELLS URINE: 0 /HPF

## 2025-07-25 ENCOUNTER — NON-APPOINTMENT (OUTPATIENT)
Age: 70
End: 2025-07-25

## 2025-07-28 LAB
APPEARANCE: CLEAR
BACTERIA UR CULT: NORMAL
BACTERIA: NEGATIVE /HPF
BILIRUBIN URINE: NEGATIVE
BLOOD URINE: NEGATIVE
CAST: 0 /LPF
COLOR: YELLOW
EPITHELIAL CELLS: 0 /HPF
GLUCOSE QUALITATIVE U: >=1000 MG/DL
KETONES URINE: NEGATIVE MG/DL
LEUKOCYTE ESTERASE URINE: NEGATIVE
MICROSCOPIC-UA: NORMAL
NITRITE URINE: NEGATIVE
PH URINE: 6
PROTEIN URINE: NEGATIVE MG/DL
PSA FREE FLD-MCNC: 22 %
PSA FREE SERPL-MCNC: 0.19 NG/ML
PSA SERPL-MCNC: 0.85 NG/ML
RED BLOOD CELLS URINE: 0 /HPF
SPECIFIC GRAVITY URINE: >1.03
UROBILINOGEN URINE: 1 MG/DL
WHITE BLOOD CELLS URINE: 0 /HPF

## 2025-07-30 ENCOUNTER — APPOINTMENT (OUTPATIENT)
Dept: ENDOCRINOLOGY | Facility: CLINIC | Age: 70
End: 2025-07-30
Payer: MEDICARE

## 2025-07-30 VITALS
SYSTOLIC BLOOD PRESSURE: 129 MMHG | BODY MASS INDEX: 27.51 KG/M2 | HEART RATE: 84 BPM | WEIGHT: 229 LBS | DIASTOLIC BLOOD PRESSURE: 74 MMHG

## 2025-07-30 DIAGNOSIS — E04.1 NONTOXIC SINGLE THYROID NODULE: ICD-10-CM

## 2025-07-30 DIAGNOSIS — E11.65 TYPE 2 DIABETES MELLITUS WITH HYPERGLYCEMIA: ICD-10-CM

## 2025-07-30 PROCEDURE — 99215 OFFICE O/P EST HI 40 MIN: CPT

## 2025-07-30 PROCEDURE — 36415 COLL VENOUS BLD VENIPUNCTURE: CPT

## 2025-07-31 ENCOUNTER — LABORATORY RESULT (OUTPATIENT)
Age: 70
End: 2025-07-31

## 2025-07-31 ENCOUNTER — APPOINTMENT (OUTPATIENT)
Dept: UROLOGY | Facility: CLINIC | Age: 70
End: 2025-07-31
Payer: MEDICARE

## 2025-07-31 VITALS
SYSTOLIC BLOOD PRESSURE: 125 MMHG | BODY MASS INDEX: 27.6 KG/M2 | HEART RATE: 79 BPM | HEIGHT: 76.5 IN | DIASTOLIC BLOOD PRESSURE: 77 MMHG | WEIGHT: 229 LBS | TEMPERATURE: 98.2 F

## 2025-07-31 DIAGNOSIS — Z87.898 PERSONAL HISTORY OF OTHER SPECIFIED CONDITIONS: ICD-10-CM

## 2025-07-31 DIAGNOSIS — R31.0 GROSS HEMATURIA: ICD-10-CM

## 2025-07-31 PROCEDURE — G2211 COMPLEX E/M VISIT ADD ON: CPT

## 2025-07-31 PROCEDURE — 99214 OFFICE O/P EST MOD 30 MIN: CPT

## 2025-08-01 LAB
ALBUMIN SERPL ELPH-MCNC: 4.3 G/DL
ALBUMIN, RANDOM URINE: <1.2 MG/DL
ALP BLD-CCNC: 80 U/L
ALT SERPL-CCNC: 19 U/L
ANION GAP SERPL CALC-SCNC: 14 MMOL/L
AST SERPL-CCNC: 24 U/L
BILIRUB SERPL-MCNC: 0.4 MG/DL
BUN SERPL-MCNC: 15 MG/DL
CALCIUM SERPL-MCNC: 9.8 MG/DL
CHLORIDE SERPL-SCNC: 106 MMOL/L
CHOLEST SERPL-MCNC: 151 MG/DL
CO2 SERPL-SCNC: 19 MMOL/L
CREAT SERPL-MCNC: 1.06 MG/DL
CREAT SPEC-SCNC: 175 MG/DL
EGFRCR SERPLBLD CKD-EPI 2021: 76 ML/MIN/1.73M2
ESTIMATED AVERAGE GLUCOSE: 235 MG/DL
GLUCOSE BLDC GLUCOMTR-MCNC: 168
GLUCOSE SERPL-MCNC: 141 MG/DL
HBA1C MFR BLD HPLC: 9.8 %
HCT VFR BLD CALC: 44.6 %
HDLC SERPL-MCNC: 49 MG/DL
HGB BLD-MCNC: 13.1 G/DL
LDLC SERPL-MCNC: 89 MG/DL
MCHC RBC-ENTMCNC: 23.1 PG
MCHC RBC-ENTMCNC: 29.4 G/DL
MCV RBC AUTO: 78.7 FL
MICROALBUMIN/CREAT 24H UR-RTO: NORMAL MG/G
NONHDLC SERPL-MCNC: 102 MG/DL
PLATELET # BLD AUTO: 195 K/UL
POTASSIUM SERPL-SCNC: 4.2 MMOL/L
PROT SERPL-MCNC: 7.1 G/DL
RBC # BLD: 5.67 M/UL
RBC # FLD: 17.2 %
SODIUM SERPL-SCNC: 139 MMOL/L
TRIGL SERPL-MCNC: 63 MG/DL
TSH SERPL-ACNC: 1.09 UIU/ML
WBC # FLD AUTO: 4.54 K/UL

## 2025-08-04 LAB
APPEARANCE: CLEAR
BACTERIA UR CULT: NORMAL
BACTERIA: NEGATIVE /HPF
BILIRUBIN URINE: NEGATIVE
BLOOD URINE: NEGATIVE
CAST: 0 /LPF
COLOR: YELLOW
EPITHELIAL CELLS: 0 /HPF
GLUCOSE QUALITATIVE U: >=1000 MG/DL
KETONES URINE: ABNORMAL MG/DL
LEUKOCYTE ESTERASE URINE: NEGATIVE
MICROSCOPIC-UA: NORMAL
NITRITE URINE: NEGATIVE
PH URINE: 6
PROTEIN URINE: NORMAL MG/DL
RED BLOOD CELLS URINE: 1 /HPF
SPECIFIC GRAVITY URINE: >1.03
UROBILINOGEN URINE: 1 MG/DL
WHITE BLOOD CELLS URINE: 0 /HPF

## 2025-08-08 ENCOUNTER — APPOINTMENT (OUTPATIENT)
Dept: CT IMAGING | Facility: HOSPITAL | Age: 70
End: 2025-08-08

## 2025-08-08 ENCOUNTER — OUTPATIENT (OUTPATIENT)
Dept: OUTPATIENT SERVICES | Facility: HOSPITAL | Age: 70
LOS: 1 days | End: 2025-08-08
Payer: MEDICARE

## 2025-08-08 ENCOUNTER — RESULT REVIEW (OUTPATIENT)
Age: 70
End: 2025-08-08

## 2025-08-08 DIAGNOSIS — Z96.649 PRESENCE OF UNSPECIFIED ARTIFICIAL HIP JOINT: Chronic | ICD-10-CM

## 2025-08-08 DIAGNOSIS — Z98.890 OTHER SPECIFIED POSTPROCEDURAL STATES: Chronic | ICD-10-CM

## 2025-08-08 PROCEDURE — 82565 ASSAY OF CREATININE: CPT

## 2025-08-08 PROCEDURE — 74178 CT ABD&PLV WO CNTR FLWD CNTR: CPT | Mod: 26

## 2025-08-08 PROCEDURE — 74178 CT ABD&PLV WO CNTR FLWD CNTR: CPT

## 2025-08-14 ENCOUNTER — APPOINTMENT (OUTPATIENT)
Dept: ENDOCRINOLOGY | Facility: CLINIC | Age: 70
End: 2025-08-14

## 2025-08-20 ENCOUNTER — APPOINTMENT (OUTPATIENT)
Dept: ENDOCRINOLOGY | Facility: CLINIC | Age: 70
End: 2025-08-20

## 2025-08-21 ENCOUNTER — NON-APPOINTMENT (OUTPATIENT)
Age: 70
End: 2025-08-21

## 2025-08-22 ENCOUNTER — APPOINTMENT (OUTPATIENT)
Dept: UROLOGY | Facility: CLINIC | Age: 70
End: 2025-08-22

## 2025-08-22 ENCOUNTER — NON-APPOINTMENT (OUTPATIENT)
Age: 70
End: 2025-08-22

## 2025-08-27 ENCOUNTER — APPOINTMENT (OUTPATIENT)
Dept: ENDOCRINOLOGY | Facility: CLINIC | Age: 70
End: 2025-08-27

## 2025-09-09 ENCOUNTER — APPOINTMENT (OUTPATIENT)
Dept: UROLOGY | Facility: CLINIC | Age: 70
End: 2025-09-09
Payer: MEDICARE

## 2025-09-09 VITALS
DIASTOLIC BLOOD PRESSURE: 76 MMHG | HEIGHT: 76.5 IN | TEMPERATURE: 98 F | WEIGHT: 229 LBS | BODY MASS INDEX: 27.6 KG/M2 | SYSTOLIC BLOOD PRESSURE: 130 MMHG | HEART RATE: 74 BPM

## 2025-09-09 PROCEDURE — 52000 CYSTOURETHROSCOPY: CPT

## (undated) DEVICE — WARMING BLANKET UPPER ADULT

## (undated) DEVICE — MARKING PEN DEVON DUAL TIP W RULER

## (undated) DEVICE — RUBBERBAND STRL LTX FR 200/CA 3X1/8IN

## (undated) DEVICE — SUT MONOCRYL PLUS 4-0 18" PS-2 UNDYED

## (undated) DEVICE — SUT SILK 2-0 30" PSL

## (undated) DEVICE — DRSG TEGADERM 2.5X3"

## (undated) DEVICE — PROBE PRASS SLIM MONOPOLAR STIMULATOR

## (undated) DEVICE — DRSG DERMABOND 0.7ML

## (undated) DEVICE — SUT CHROMIC 3-0 27" SH

## (undated) DEVICE — SUT CHROMIC 4-0 27" SH-1

## (undated) DEVICE — SUT SILK 3-0 18" TIES

## (undated) DEVICE — DRSG TEGADERM 4X4.75"

## (undated) DEVICE — SUT VICRYL 4-0 27" SH UNDYED

## (undated) DEVICE — BIPOLAR FORCEP SYMMETRY BAYONET STR 8.25" X 1.5MM (BLUE)

## (undated) DEVICE — DRAPE TOWEL BLUE 17" X 24"

## (undated) DEVICE — SUT VICRYL PLUS 2-0 27" SH UNDYED

## (undated) DEVICE — SUT VICRYL 3-0 27" SH

## (undated) DEVICE — DRAPE MAGNETIC INSTRUMENT MEDIUM

## (undated) DEVICE — PACK BLEPHAROPLASTY

## (undated) DEVICE — STAPLER SKIN PROXIMATE

## (undated) DEVICE — SUT SILK 3-0 30" SH

## (undated) DEVICE — DRAIN SILICONE ROUND 9FR

## (undated) DEVICE — SPONGE PEANUT AUTO COUNT

## (undated) DEVICE — LIGASURE EXACT DISSECTOR

## (undated) DEVICE — SUCTION YANKAUER BULBOUS TIP NO VENT

## (undated) DEVICE — VENODYNE/SCD SLEEVE CALF MEDIUM

## (undated) DEVICE — PACK UPPER BODY

## (undated) DEVICE — SUT SILK 2-0 12-18"